# Patient Record
Sex: FEMALE | Race: WHITE | NOT HISPANIC OR LATINO | Employment: FULL TIME | URBAN - NONMETROPOLITAN AREA
[De-identification: names, ages, dates, MRNs, and addresses within clinical notes are randomized per-mention and may not be internally consistent; named-entity substitution may affect disease eponyms.]

---

## 2017-09-12 ENCOUNTER — COMMUNICATION - GICH (OUTPATIENT)
Dept: OBGYN | Facility: OTHER | Age: 27
End: 2017-09-12

## 2017-11-21 ENCOUNTER — AMBULATORY - GICH (OUTPATIENT)
Dept: SCHEDULING | Facility: OTHER | Age: 27
End: 2017-11-21

## 2017-12-28 NOTE — TELEPHONE ENCOUNTER
Patient Information     Patient Name MRN Danette Field [id number] Female 1990      Telephone Encounter by Anita Medrano RN at 2017  2:04 PM     Author:  Anita Medrano RN Service:  (none) Author Type:  NURS- Registered Nurse     Filed:  2017  2:07 PM Encounter Date:  2017 Status:  Signed     :  Anita Medrano RN (NURS- Registered Nurse)            Dr Trudy Vega from [facility] is calling - requesting a call back from Dr Kayode Kearney MD, FACOG to discuss co-managing the care of this patient. Would like a call back to discuss recent office visit.    Patient would like to receive OB care in [place] due to insurance preferences and ultimately finish OB care and delivery at [facility] with Dr Kayode Kearney MD, FACOG.    Message sent to Dr Kayode Kearney MD, FACOG. Would like MD to return call between patients.    Anita Medrano RN............. 2017 2:07 PM

## 2017-12-28 NOTE — TELEPHONE ENCOUNTER
Patient Information     Patient Name MRN Danette Field [id number] Female 1990      Telephone Encounter by Anita Medrano RN at 2017 11:01 AM     Author:  Anita Medrano RN Service:  (none) Author Type:  NURS- Registered Nurse     Filed:  2017 11:01 AM Encounter Date:  2017 Status:  Signed     :  Anita Medrano RN (NURS- Registered Nurse)            Per provider, patient spoke with Dr Vega yesterday for a phone consult regarding this patient.  Anita Medrano RN............. 2017 11:01 AM

## 2018-01-12 ENCOUNTER — PRENATAL OFFICE VISIT - GICH (OUTPATIENT)
Dept: OBGYN | Facility: OTHER | Age: 28
End: 2018-01-12

## 2018-01-12 ENCOUNTER — HISTORY (OUTPATIENT)
Dept: OBGYN | Facility: OTHER | Age: 28
End: 2018-01-12

## 2018-01-12 DIAGNOSIS — Z3A.00 WEEKS OF GESTATION OF PREGNANCY NOT SPECIFIED: ICD-10-CM

## 2018-01-12 DIAGNOSIS — O99.89 OTHER SPECIFIED DISEASES AND CONDITIONS COMPLICATING PREGNANCY, CHILDBIRTH AND THE PUERPERIUM (CODE): ICD-10-CM

## 2018-01-12 DIAGNOSIS — Z20.828 CONTACT WITH AND (SUSPECTED) EXPOSURE TO OTHER VIRAL COMMUNICABLE DISEASES: ICD-10-CM

## 2018-01-12 DIAGNOSIS — Z34.83 ENCOUNTER FOR SUPERVISION OF OTHER NORMAL PREGNANCY, THIRD TRIMESTER: ICD-10-CM

## 2018-01-14 LAB — CULTURE - HISTORICAL: NORMAL

## 2018-01-15 LAB
Lab: <0.2 U/ML
Lab: <8 AU/ML (ref 0–29.9)

## 2018-01-19 ENCOUNTER — PRENATAL OFFICE VISIT - GICH (OUTPATIENT)
Dept: OBGYN | Facility: OTHER | Age: 28
End: 2018-01-19

## 2018-01-19 ENCOUNTER — HISTORY (OUTPATIENT)
Dept: OBGYN | Facility: OTHER | Age: 28
End: 2018-01-19

## 2018-01-19 DIAGNOSIS — Z34.83 ENCOUNTER FOR SUPERVISION OF OTHER NORMAL PREGNANCY, THIRD TRIMESTER: ICD-10-CM

## 2018-01-23 ENCOUNTER — COMMUNICATION - GICH (OUTPATIENT)
Dept: OBGYN | Facility: OTHER | Age: 28
End: 2018-01-23

## 2018-01-23 DIAGNOSIS — J32.9 CHRONIC SINUSITIS: ICD-10-CM

## 2018-01-25 ENCOUNTER — HISTORY (OUTPATIENT)
Dept: OBGYN | Facility: OTHER | Age: 28
End: 2018-01-25

## 2018-01-25 ENCOUNTER — PRENATAL OFFICE VISIT - GICH (OUTPATIENT)
Dept: OBGYN | Facility: OTHER | Age: 28
End: 2018-01-25

## 2018-01-25 DIAGNOSIS — Z34.83 ENCOUNTER FOR SUPERVISION OF OTHER NORMAL PREGNANCY, THIRD TRIMESTER: ICD-10-CM

## 2018-01-30 ENCOUNTER — HISTORY (OUTPATIENT)
Dept: OBGYN | Facility: OTHER | Age: 28
End: 2018-01-30

## 2018-02-09 VITALS
TEMPERATURE: 97.9 F | WEIGHT: 161.8 LBS | DIASTOLIC BLOOD PRESSURE: 76 MMHG | SYSTOLIC BLOOD PRESSURE: 112 MMHG | BODY MASS INDEX: 26.92 KG/M2 | HEART RATE: 78 BPM

## 2018-02-09 VITALS
HEART RATE: 84 BPM | SYSTOLIC BLOOD PRESSURE: 104 MMHG | DIASTOLIC BLOOD PRESSURE: 64 MMHG | WEIGHT: 160.8 LBS | BODY MASS INDEX: 26.76 KG/M2

## 2018-02-09 VITALS
WEIGHT: 161 LBS | SYSTOLIC BLOOD PRESSURE: 118 MMHG | HEART RATE: 88 BPM | DIASTOLIC BLOOD PRESSURE: 72 MMHG | BODY MASS INDEX: 26.79 KG/M2

## 2018-02-12 NOTE — PROGRESS NOTES
Patient Information     Patient Name MRN Sex Danette Alex [id number] Female 1990      Progress Notes by Kayode Kearney MD at 2018 10:00 AM     Author:  Kayode Kearney MD Service:  (none) Author Type:  Physician     Filed:  2018 10:32 AM Encounter Date:  2018 Status:  Signed     :  Kayode Kearney MD (Physician)            [facility]  Return OB Visit    Problem List:  Estimated Date of Delivery: 18      OB History                    Para  Term     AB  Living     4   1  0     2  1     SAB   TAB  Ectopic  Multiple   Live Births       2          1                        # Outcome Date  GA  Lbr Ascencion/2nd  Weight Sex  Delivery Anes PTL Lv   4 Current                3 SAB 17               2 Para 16  39w4d    3.839 kg (8 lb 7.4 oz) M  Vag EPIDURAL  JOMAR   1 SAB 03/03/15                  Birth Comments: System Generated. Please review and update pregnancy details.                                    Immunization History     Administered  Date(s) Administered     MMR 2016     Tdap 2016       A Rh Positive  Rubella:Immune  28 week labs:done  Tdap done  flu:done  GBS:2018    S: Patient reports she has been feeling well. She denies cramping, contractions, vaginal bleeding, leaking fluid. She reports normal fetal movement. She has no other complaints. She had recent exposure to someone with CMV.    O: /64 (Cuff Site: Right Arm, Position: Sitting, Cuff Size: Adult Regular)  Pulse 84  Wt 72.9 kg (160 lb 12.8 oz)  BMI 26.76 kg/m2  See flowsheet    Gen: Well-appearing, NAD  Cervix: FT, long, posterior    Fundal Height:  36  FHR: 160  EFW AGA  Pelvis seems adequate    A/P:  Danette Miranda is a 27 y.o.  at 36w5d by LMP and early US, here for return OB visit.    RTC weekly    Kayode Kearney MD FACOG  10:29 AM 2018

## 2018-02-13 NOTE — PROGRESS NOTES
Patient Information     Patient Name MRN Sex Danette Alex [id number] Female 1990      Progress Notes by Kayode Kearney MD at 2018 10:45 AM     Author:  Kayode Kearney MD Service:  (none) Author Type:  Physician     Filed:  2018 11:19 AM Encounter Date:  2018 Status:  Signed     :  Kayode Kearney MD (Physician)            [facility]  Return OB Visit    Problem List:  Estimated Date of Delivery: 18      OB History                    Para  Term     AB  Living     4   1  0     2  1     SAB   TAB  Ectopic  Multiple   Live Births       2          1                        # Outcome Date  GA  Lbr Ascencion/2nd  Weight Sex  Delivery Anes PTL Lv   4 Current                3 SAB 17               2 Para 16  39w4d    3.839 kg (8 lb 7.4 oz) M  Vag EPIDURAL  JOMAR   1 SAB 03/03/15                  Birth Comments: System Generated. Please review and update pregnancy details.                                    Immunization History     Administered  Date(s) Administered     MMR 2016     Tdap 2016       A Rh Positive  Rubella:Immune  28 week labs:done  Tdap done  flu:done  GBS:2018    S: Patient reports she has been feeling well. She denies cramping, contractions, vaginal bleeding, leaking fluid. She reports normal fetal movement. She has no other complaints.   O: /72 (Cuff Site: Right Arm, Position: Sitting, Cuff Size: Adult Regular)  Pulse 88  Wt 73 kg (161 lb)  BMI 26.79 kg/m2  See flowsheet    Gen: Well-appearing, NAD  Cervix: 50/-1    FHR: 130  EFW AGA  Pelvis seems adequate    A/P:  Danette Miranda is a 27 y.o.  at 37w5d by LMP and early US, here for return OB visit.    RTC weekly    Kayode Kearney MD FACOG  11:18 AM 2018

## 2018-02-13 NOTE — TELEPHONE ENCOUNTER
Patient Information     Patient Name MRN aDnette Field [id number] Female 1990      Telephone Encounter by Diane Perez RN at 2018  7:43 AM     Author:  Diane Perez RN Service:  (none) Author Type:  NURS- Registered Nurse     Filed:  2018  7:43 AM Encounter Date:  2018 Status:  Signed     :  Diane Perez RN (NURS- Registered Nurse)            Patient notified of new prescription. She was very grateful.  Diane Perez RN ....................  2018   7:43 AM

## 2018-02-13 NOTE — PROGRESS NOTES
Patient Information     Patient Name MRN Sex Danette Alex [id number] Female 1990      Progress Notes by Kayode Kearney MD at 2018 10:15 AM     Author:  Kayode Kearney MD Service:  (none) Author Type:  Physician     Filed:  2018 10:33 AM Encounter Date:  2018 Status:  Signed     :  Kayode Kearney MD (Physician)            [facility]  Return OB Visit    Problem List:  Estimated Date of Delivery: 18      OB History                    Para  Term     AB  Living     4   1  0     2  1     SAB   TAB  Ectopic  Multiple   Live Births       2          1                        # Outcome Date  GA  Lbr Ascencion/2nd  Weight Sex  Delivery Anes PTL Lv   4 Current                3 SAB 17               2 Para 16  39w4d    3.839 kg (8 lb 7.4 oz) M  Vag EPIDURAL  JOMAR   1 SAB 03/03/15                  Birth Comments: System Generated. Please review and update pregnancy details.                                    Immunization History     Administered  Date(s) Administered     MMR 2016     Tdap 2016       A Rh Positive  Rubella:Immune  28 week labs:done  Tdap done  flu:done  GBS:2018    S: Patient reports she has been feeling well. She denies cramping, contractions, vaginal bleeding, leaking fluid. She reports normal fetal movement. She has no other complaints.   O: /76 (Cuff Site: Right Arm, Position: Sitting, Cuff Size: Adult Regular)  Pulse 78  Temp 97.9  F (36.6  C) (Tympanic)   Wt 73.4 kg (161 lb 12.8 oz)  BMI 26.92 kg/m2  See flowsheet    Gen: Well-appearing, NAD  Cervix: 2-3/50/-1    FHR: 140  EFW AGA  Pelvis seems adequate    A/P:  Danette Miranda is a 27 y.o.  at 38w4d by LMP and early US, here for return OB visit.    Patient requesting elective induction. She was scheduled for next week with her favorable cervix at 39w1d, assuming staff availability.    Kayode Kearney MD FACOG  10:32 AM 2018

## 2018-02-13 NOTE — TELEPHONE ENCOUNTER
Patient Information     Patient Name MRN Danette Field [id number] Female 1990      Telephone Encounter by Diane Perez RN at 2018  2:11 PM     Author:  Diane Perze RN Service:  (none) Author Type:  NURS- Registered Nurse     Filed:  2018  2:17 PM Encounter Date:  2018 Status:  Signed     :  Diane Perez RN (NURS- Registered Nurse)            Patient thinks she has a sinus infection. She has facial pressure, her teeth hurt, and she has green nasal drainage. Symptoms started 3-4 days ago. Denies fever and chills. No concerning relating to pregnancy. Her main concern is not being able to breathe well if she ends up going into labor. Patient has appointment on Thursday, but is wondering if she would be able to get a prescription sent to [facility] today. She would like this addressed by Dr Kayode Kearney MD, FACOG, not her doctor in [place], if possible.    Please advise.    Diane Perez RN ....................  2018   2:16 PM

## 2018-03-01 ENCOUNTER — DOCUMENTATION ONLY (OUTPATIENT)
Dept: FAMILY MEDICINE | Facility: OTHER | Age: 28
End: 2018-03-01

## 2018-03-01 PROBLEM — S86.819A SPRAIN AND STRAIN OF OTHER SPECIFIED SITES OF KNEE AND LEG: Status: ACTIVE | Noted: 2018-03-01

## 2018-03-01 PROBLEM — S83.8X9A SPRAIN AND STRAIN OF OTHER SPECIFIED SITES OF KNEE AND LEG: Status: ACTIVE | Noted: 2018-03-01

## 2018-03-01 PROBLEM — Z34.83 NORMAL PREGNANCY IN MULTIGRAVIDA IN THIRD TRIMESTER: Status: ACTIVE | Noted: 2018-01-12

## 2018-03-01 RX ORDER — PRENATAL VIT/IRON FUM/FOLIC AC 27MG-0.8MG
1 TABLET ORAL DAILY
COMMUNITY
Start: 2016-07-18

## 2018-03-01 RX ORDER — ACYCLOVIR 400 MG/1
400 TABLET ORAL DAILY
Status: ON HOLD | COMMUNITY
Start: 2016-02-01 | End: 2022-03-08

## 2018-03-06 ENCOUNTER — PRENATAL OFFICE VISIT (OUTPATIENT)
Dept: OBGYN | Facility: OTHER | Age: 28
End: 2018-03-06
Attending: OBSTETRICS & GYNECOLOGY
Payer: COMMERCIAL

## 2018-03-06 VITALS
WEIGHT: 152.7 LBS | HEART RATE: 82 BPM | DIASTOLIC BLOOD PRESSURE: 68 MMHG | SYSTOLIC BLOOD PRESSURE: 118 MMHG | BODY MASS INDEX: 25.41 KG/M2

## 2018-03-06 PROBLEM — Z34.83 NORMAL PREGNANCY IN MULTIGRAVIDA IN THIRD TRIMESTER: Status: RESOLVED | Noted: 2018-01-12 | Resolved: 2018-03-06

## 2018-03-06 PROCEDURE — 99207 ZZC POST-PARTUM 6 WK VISIT - GICH ONLY: CPT | Performed by: OBSTETRICS & GYNECOLOGY

## 2018-03-06 ASSESSMENT — PAIN SCALES - GENERAL: PAINLEVEL: NO PAIN (0)

## 2018-03-06 NOTE — MR AVS SNAPSHOT
"              After Visit Summary   3/6/2018    Danette Miranda    MRN: [id number]           Patient Information     Date Of Birth          1990        Visit Information        Provider Department      3/6/2018 1:00 PM Kayode Kearney MD [facility]        Today's Diagnoses     Routine postpartum follow-up    -  1       Follow-ups after your visit        Follow-up notes from your care team     Return in about 1 year (around 3/6/2019) for Physical Exam.      Who to contact     If you have questions or need follow up information about today's clinic visit or your schedule please contact [facility] directly at [phone].  Normal or non-critical lab and imaging results will be communicated to you by MyChart, letter or phone within 4 business days after the clinic has received the results. If you do not hear from us within 7 days, please contact the clinic through [employer] or phone. If you have a critical or abnormal lab result, we will notify you by phone as soon as possible.  Submit refill requests through [employer] or call your pharmacy and they will forward the refill request to us. Please allow 3 business days for your refill to be completed.          Additional Information About Your Visit        MyChart Information     [employer] lets you send messages to your doctor, view your test results, renew your prescriptions, schedule appointments and more. To sign up, go to [email or web address] . Click on \"Log in\" on the left side of the screen, which will take you to the Welcome page. Then click on \"Sign up Now\" on the right side of the page.     You will be asked to enter the access code listed below, as well as some personal information. Please follow the directions to create your username and password.     Your access code is: [email or web address]  Expires: 2018  1:42 PM     Your access code will  in 90 days. If you need help or a new code, please call your " [facility] or [phone].        Care EveryWhere ID     This is your Care EveryWhere ID. This could be used by other organizations to access your [facility] medical records  [id number]        Your Vitals Were     Pulse Breastfeeding? BMI (Body Mass Index)             82 Yes 25.41 kg/m2          Blood Pressure from Last 3 Encounters:   03/06/18 118/68   01/25/18 112/76   01/19/18 118/72    Weight from Last 3 Encounters:   03/06/18 69.3 kg (152 lb 11.2 oz)   01/25/18 73.4 kg (161 lb 12.8 oz)   01/19/18 73 kg (161 lb)              Today, you had the following     No orders found for display         Today's Medication Changes          These changes are accurate as of 3/6/18  1:42 PM.  If you have any questions, ask your nurse or doctor.               Stop taking these medicines if you haven't already. Please contact your care team if you have questions.     ferrous sulfate 134 MG Tabs   Stopped by:  Kayode Kearney MD                    Primary Care Provider Office Phone # Fax #    Brianda Edmond [phone]       [facility] [address] [facility] [address]        Equal Access to Services     [facility]: Hadii julia parsonso Soanjali, waaxda luqadaha, qaybta kaalmada aderobyada, leo miner. So [facility] [phone].    ATENCIÓN: Si habla español, tiene a berger disposición servicios gratuitos de asistencia lingüística. Llame al [phone].    We comply with applicable federal civil rights laws and [place] laws. We do not discriminate on the basis of race, color, national origin, age, disability, sex, sexual orientation, or gender identity.            Thank you!     Thank you for choosing [facility] AND [facility]  for your care. Our goal is always to provide you with excellent care. Hearing back from our patients is one way we can continue to improve our services. Please take a few minutes to complete the written survey that you may receive in the mail  after your visit with us. Thank you!             Your Updated Medication List - Protect others around you: Learn how to safely use, store and throw away your medicines at www.disposemymeds.org.          This list is accurate as of 3/6/18  1:42 PM.  Always use your most recent med list.                   Brand Name Dispense Instructions for use Diagnosis    acyclovir 400 MG tablet    ZOVIRAX     400 mg daily        prenatal multivitamin plus iron 27-0.8 MG Tabs per tablet      Take 1 tablet by mouth daily

## 2018-03-06 NOTE — PROGRESS NOTES
CC: postpartum exam at 5 weeks from delivery    HPI: Danette Miranda presents for postpartum exam. Baby was born by vaginal delivery. Complications included none.  Mood:OK    Breastfeeding:yes  Incision(s): none  Bleeding: no  Birthcontrol: considering IUD    Past Medical History:   Diagnosis Date     Incomplete spontaneous  without complication     No Comments Provided     Personal history of other medical treatment (CODE)     cruciate ligament of knee (right)     Past Surgical History:   Procedure Laterality Date     OTHER SURGICAL HISTORY      [id number],OTHER,Right,Right knee     Current Outpatient Prescriptions   Medication     acyclovir (ZOVIRAX) 400 MG tablet     Prenatal Vit-Fe Fumarate-FA (PRENATAL MULTIVITAMIN PLUS IRON) 27-0.8 MG TABS per tablet     No current facility-administered medications for this visit.       Allergies   Allergen Reactions     No Clinical Screening - See Comments Itching     Environmental allergies - eye irritation       REVIEW OF SYSTEMS  General: negative  GI: negative  : negative  Psychiatric: negative    O: /68 (BP Location: Right arm, Patient Position: Sitting)  Pulse 82  Wt 69.3 kg (152 lb 11.2 oz)  Breastfeeding? Yes  BMI 25.41 kg/m2  Body mass index is 25.41 kg/(m^2).    Exam:  Constitutional: healthy, alert, active and no distress    PHQ-9 score:    PHQ-9 SCORE 3/6/2018   Total Score 0       Results for orders placed or performed during the hospital encounter of 18   CBC with platelets differential   Result Value Ref Range    Absolute Immature Granulocytes (Metas,Myelos,Pros) - Historical 0.0 <=0.3 thou/cu mm    Absolute Monocytes - Historical 0.5 <0.9 thou/cu mm    Absolute Lymphocytes - Historical 2.3 0.9 - 2.9 thou/cu mm    % Eosinophils 0.3 <8.0 %    MCH 30.1 26.0 - 34.0 pg    MCV 91 80 - 100 fL    % Neutrophils 52.9 42.0 - 72.0 %    Hemoglobin 12.1 12.0 - 16.0 g/dL    Absolute Basophils - Historical 0.0 <0.3 thou/cu mm    BASO 0.3 <3.0 %    %  Lymphocytes 38.2 20.0 - 44.0 %    Absolute Neutrophils - Historical 3.1 1.7 - 7.0 thou/cu mm    RDW 13.8 11.5 - 15.5 %    MCHC 33.2 32.0 - 36.0 g/dL    Absolute Eosinophils - Historical 0.0 <0.5 thou/cu mm    Platelet Count 293 140 - 440 thou/cu mm    Hematocrit 36.4 33.0 - 51.0 %    Red Blood Count - Historical 4.02 4.00 - 5.20 mil/cu mm    White Blood Count - Historical 5.9 4.5 - 11.0 thou/cu mm    Immature Granulocytes (Metas,Myelos,Pros) - Historical 0.5 %    % Monocytes 7.8 <12.0 %    MPV 10.1 6.5 - 11.0 fL   Hemoglobin   Result Value Ref Range    MCV 91 80 - 100 fL    Hemoglobin 10.3 (L) 12.0 - 16.0 g/dL   ABO/Rh type and screen   Result Value Ref Range    Antibody Screen - Historical Negative Negative    ABORH - Historical A Rh Positive     Specimen Expiration Date/Time - Historical 02/02/18 06:20          I/P:  Postpartum visit.    Resumeannual preventive healthcare. Continue PNV's until done with childbearing.    (Z39.2) Routine postpartum follow-up  (primary encounter diagnosis)  Comment:   Plan: IUD insertion with menses or two weeks from now.    RTC prn    Kayode Kearney MD FACOG  1:40 PM 3/6/2018

## 2018-03-07 ASSESSMENT — PATIENT HEALTH QUESTIONNAIRE - PHQ9: SUM OF ALL RESPONSES TO PHQ QUESTIONS 1-9: 0

## 2021-08-09 ENCOUNTER — ALLIED HEALTH/NURSE VISIT (OUTPATIENT)
Dept: OBGYN | Facility: OTHER | Age: 31
End: 2021-08-09
Attending: OBSTETRICS & GYNECOLOGY
Payer: COMMERCIAL

## 2021-08-09 VITALS
SYSTOLIC BLOOD PRESSURE: 112 MMHG | OXYGEN SATURATION: 99 % | BODY MASS INDEX: 24.24 KG/M2 | HEIGHT: 65 IN | WEIGHT: 145.5 LBS | DIASTOLIC BLOOD PRESSURE: 64 MMHG | RESPIRATION RATE: 16 BRPM | HEART RATE: 68 BPM

## 2021-08-09 DIAGNOSIS — Z34.90 EARLY STAGE OF PREGNANCY: ICD-10-CM

## 2021-08-09 DIAGNOSIS — Z32.01 PREGNANCY TEST POSITIVE: Primary | ICD-10-CM

## 2021-08-09 PROBLEM — R87.610 ASCUS OF CERVIX WITH NEGATIVE HIGH RISK HPV: Status: ACTIVE | Noted: 2017-09-11

## 2021-08-09 PROBLEM — Z86.19 HISTORY OF COLD SORES: Status: ACTIVE | Noted: 2018-07-06

## 2021-08-09 LAB — HCG UR QL: POSITIVE

## 2021-08-09 PROCEDURE — 81025 URINE PREGNANCY TEST: CPT | Mod: ZL

## 2021-08-09 PROCEDURE — 99207 PR OB VISIT-NO CHARGE - GICH ONLY: CPT

## 2021-08-09 ASSESSMENT — PATIENT HEALTH QUESTIONNAIRE - PHQ9: SUM OF ALL RESPONSES TO PHQ QUESTIONS 1-9: 0

## 2021-08-09 ASSESSMENT — PAIN SCALES - GENERAL: PAINLEVEL: NO PAIN (0)

## 2021-08-09 ASSESSMENT — MIFFLIN-ST. JEOR: SCORE: 1390.86

## 2021-08-09 NOTE — PROGRESS NOTES
HPI:    This is a 30 year old female patient,  who presents today for OB Intake visit. Patient reports positive pregnancy test at home.     Obstetrical history and OB Questionnaire updated to the best of this nurse's ability based on patient report. PHQ-9 depression screening and routine Domestic Abuse screening completed. All immediate questions and concerns answered.    FOOD SECURITY SCREENING QUESTIONS  Hunger Vital Signs:  Within the past 12 months we worried whether our food would run out before we got money to buy more. Never  Within the past 12 months the food we bought just didn't last and we didn't have money to get more. Never    Last menstrual period is reported as Patient's last menstrual period was 2021 (exact date). SHAYY based on LMP is Estimated Date of Delivery: Mar 11, 2022.  Her cycles are regular.  Her last menstrual period was normal.   Since her LMP, she has experienced  nausea, emesis, fatigue and lightheadedness.       OBSTETRIC HISTORY:    OB History    Para Term  AB Living   5 2 2 0 2 2   SAB TAB Ectopic Multiple Live Births   2 0 0 0 2      # Outcome Date GA Lbr Ascencion/2nd Weight Sex Delivery Anes PTL Lv   5 Current            4 Term 18 39w2d  3.487 kg (7 lb 11 oz) F Vag-Spont   JOMAR      Name: [place]      Apgar1: 8  Apgar5: 9   3 SAB 17           2 Term 16 39w4d  3.839 kg (8 lb 7.4 oz) M Vag-Spont  N JOMAR      Name: LYUDMILA ROSS      Apgar1: 2  Apgar5: 6   1 SAB 03/03/15 7w0d             Birth Comments: System Generated. Please review and update pregnancy details.       Age of first pregnancy: 24  Previous OB Provider: Christel  Previous Delivering Clinic: [facility]  Release of Records: NA    Current delivery plan: [facility]  Preferred OB Provider: Christel  Current Primary Care Provider: Christa Fournier  Pediatrician: Dr. Chadwick    Additional History: none    Have you travelled during the pregnancy?No  Have your sexual partner(s) travelled during the  pregnancy?No      HISTORY:   Planned Pregnancy: Yes  Marital Status:   Occupation: RN  Living in Household: Significant Other and Children    Father of the baby is involved.   Family and father of baby are supportive of current pregnancy.  Past Medical History of Father of Baby:No significant medical history    Past History:  Her past medical history   Past Medical History:   Diagnosis Date     Incomplete spontaneous  without complication     No Comments Provided     Personal history of other medical treatment (CODE)     cruciate ligament of knee (right)   .      Her past surgical history:   Past Surgical History:   Procedure Laterality Date     ARTHROSCOPIC REPAIR ACL Right      DILATION AND CURETTAGE  2015       She has a history of  none    Since her LMP she denies use of alcohol, tobacco and street drugs.    Pap smear history: YES - updated in Problem List and Health Maintenance accordingly    STD/STI history: No STD history    STD/STI symptoms: no noticeable symptoms     Past medical, surgical, social and family history were reviewed and updated in EPIC.    Medications reviewed by this nurse. Current medication list:  Current Outpatient Medications   Medication Sig Dispense Refill     acyclovir (ZOVIRAX) 400 MG tablet 400 mg daily       Prenatal Vit-Fe Fumarate-FA (PRENATAL MULTIVITAMIN PLUS IRON) 27-0.8 MG TABS per tablet Take 1 tablet by mouth daily       The following medications were recommended to be discontinued due to Pregnancy Category D status: NA  Patient informed to contact her primary care provider as soon as possible to discuss a safer alternative.    Risk factors:  Moderate and moderately severe risks (consult with OB/Gyn)  Previous fetal or  demise: Yes  History of  delivery: No  History of heart disease Class I: No  Severe anemia, unresponsive to iron therapy: No  Pelvic mass or neoplasm: No  Previous : No  Hyper/hypothyroidism: No  History of  postpartum hemorrhage requiring transfusion:No  History of Placenta Accreta: No    High Risk (Pregnancy managed by OB/Gyn)  Multiple pregnancy: No  Pre-gestational diabetes: No  Chronic Hypertension: No  Renal Failure: No  Heart disease, class II or greater: No  Rh Isoimmunization: No  Chronic active hepatitis: No  Convulsive disorder, poorly controlled: No  Isoimmune thrombocytopenia: No  Pre-term premature rupture of membranes: No  Lupus or other autoimmune disorder: No  Human Immunodeficiency Virus: No    hCG Urine Qualitative   Date Value Ref Range Status   2021 Positive (A) Negative Final     Comment:     This test is for screening purposes.  Results should be interpreted along with the clinical picture.  Confirmation testing is available if warranted by ordering ACE114, HCG Quantitative Pregnancy.       ASSESSMENT/PLAN:       ICD-10-CM    1. Pregnancy test positive  Z32.01 Pregnancy, Urine (HCG)     US OB < 14 Weeks Single   2. Early stage of pregnancy  Z34.90        30 year old , 9w3d of pregnancy with SHAYY of 3/11/2022, Alternate SHAYY Entry    Urine pregnancy test was completed today and results are noted above.    Per standing orders and scope of practice of this nurse, patient will have the following orders placed and completed prior to initial OB visit with the appropriate provider:    --early ultrasound for dating and viability ordered for 6+ weeks gestation based on LMP    --Quantitative Beta HCG and progesterone monitoring if indicated    Counseling given:     - Recommended weight gain for pregnancy: 25-35 lbs.   BMI < 18.5  28-40 lbs   18.5 - 24.9 25-35   25 - 29.9 15-25   > 30  < 15       PLAN/PATIENT INSTRUCTIONS:    Normal exercise.  Normal sexual activity.  Prenatal vitamins.  Anticipated weight gain.    follow-up appointment with Dr. Kearney for pre- care and take multivitamin or pre- vitamins    Veda Lester RN.................................................. 2021 3:36  PM

## 2021-08-12 ENCOUNTER — HOSPITAL ENCOUNTER (OUTPATIENT)
Dept: ULTRASOUND IMAGING | Facility: OTHER | Age: 31
Discharge: HOME | End: 2021-08-12
Attending: OBSTETRICS & GYNECOLOGY | Admitting: OBSTETRICS & GYNECOLOGY
Payer: COMMERCIAL

## 2021-08-12 DIAGNOSIS — Z32.01 PREGNANCY TEST POSITIVE: ICD-10-CM

## 2021-08-12 PROCEDURE — 76801 OB US < 14 WKS SINGLE FETUS: CPT

## 2021-08-14 ENCOUNTER — HEALTH MAINTENANCE LETTER (OUTPATIENT)
Age: 31
End: 2021-08-14

## 2021-09-13 ENCOUNTER — PRENATAL OFFICE VISIT (OUTPATIENT)
Dept: OBGYN | Facility: OTHER | Age: 31
End: 2021-09-13
Attending: OBSTETRICS & GYNECOLOGY
Payer: COMMERCIAL

## 2021-09-13 VITALS
BODY MASS INDEX: 24.01 KG/M2 | RESPIRATION RATE: 18 BRPM | DIASTOLIC BLOOD PRESSURE: 72 MMHG | SYSTOLIC BLOOD PRESSURE: 98 MMHG | HEART RATE: 88 BPM | WEIGHT: 144.3 LBS

## 2021-09-13 DIAGNOSIS — O21.9 NAUSEA AND VOMITING DURING PREGNANCY: ICD-10-CM

## 2021-09-13 DIAGNOSIS — Z34.82 NORMAL PREGNANCY IN MULTIGRAVIDA IN SECOND TRIMESTER: ICD-10-CM

## 2021-09-13 DIAGNOSIS — Z34.90 EARLY STAGE OF PREGNANCY: Primary | ICD-10-CM

## 2021-09-13 DIAGNOSIS — G44.209 TENSION HEADACHE: ICD-10-CM

## 2021-09-13 LAB
ABO/RH(D): NORMAL
ALBUMIN UR-MCNC: NEGATIVE MG/DL
ANTIBODY SCREEN: NEGATIVE
APPEARANCE UR: CLEAR
BILIRUB UR QL STRIP: NEGATIVE
C TRACH DNA SPEC QL PROBE+SIG AMP: NEGATIVE
COLOR UR AUTO: NORMAL
ERYTHROCYTE [DISTWIDTH] IN BLOOD BY AUTOMATED COUNT: 13.5 % (ref 10–15)
GLUCOSE UR STRIP-MCNC: NEGATIVE MG/DL
HCT VFR BLD AUTO: 33 % (ref 35–47)
HGB BLD-MCNC: 11 G/DL (ref 11.7–15.7)
HGB UR QL STRIP: NEGATIVE
KETONES UR STRIP-MCNC: NEGATIVE MG/DL
LEUKOCYTE ESTERASE UR QL STRIP: NEGATIVE
MCH RBC QN AUTO: 30.1 PG (ref 26.5–33)
MCHC RBC AUTO-ENTMCNC: 33.3 G/DL (ref 31.5–36.5)
MCV RBC AUTO: 90 FL (ref 78–100)
N GONORRHOEA DNA SPEC QL NAA+PROBE: NEGATIVE
NITRATE UR QL: NEGATIVE
PH UR STRIP: 7.5 [PH] (ref 5–9)
PLATELET # BLD AUTO: 320 10E3/UL (ref 150–450)
RBC # BLD AUTO: 3.65 10E6/UL (ref 3.8–5.2)
SP GR UR STRIP: 1.02 (ref 1–1.03)
SPECIMEN EXPIRATION DATE: NORMAL
UROBILINOGEN UR STRIP-MCNC: NORMAL MG/DL
WBC # BLD AUTO: 9.6 10E3/UL (ref 4–11)

## 2021-09-13 PROCEDURE — 86901 BLOOD TYPING SEROLOGIC RH(D): CPT | Mod: ZL | Performed by: OBSTETRICS & GYNECOLOGY

## 2021-09-13 PROCEDURE — 87340 HEPATITIS B SURFACE AG IA: CPT | Mod: ZL | Performed by: OBSTETRICS & GYNECOLOGY

## 2021-09-13 PROCEDURE — 36415 COLL VENOUS BLD VENIPUNCTURE: CPT | Mod: ZL | Performed by: OBSTETRICS & GYNECOLOGY

## 2021-09-13 PROCEDURE — 99207 PR OB VISIT-NO CHARGE - GICH ONLY: CPT | Performed by: OBSTETRICS & GYNECOLOGY

## 2021-09-13 PROCEDURE — 85027 COMPLETE CBC AUTOMATED: CPT | Mod: ZL | Performed by: OBSTETRICS & GYNECOLOGY

## 2021-09-13 PROCEDURE — 86762 RUBELLA ANTIBODY: CPT | Mod: ZL | Performed by: OBSTETRICS & GYNECOLOGY

## 2021-09-13 PROCEDURE — 87389 HIV-1 AG W/HIV-1&-2 AB AG IA: CPT | Mod: ZL | Performed by: OBSTETRICS & GYNECOLOGY

## 2021-09-13 PROCEDURE — 86780 TREPONEMA PALLIDUM: CPT | Mod: ZL | Performed by: OBSTETRICS & GYNECOLOGY

## 2021-09-13 PROCEDURE — 87491 CHLMYD TRACH DNA AMP PROBE: CPT | Mod: ZL | Performed by: OBSTETRICS & GYNECOLOGY

## 2021-09-13 PROCEDURE — 81003 URINALYSIS AUTO W/O SCOPE: CPT | Mod: ZL | Performed by: OBSTETRICS & GYNECOLOGY

## 2021-09-13 RX ORDER — CYCLOBENZAPRINE HCL 5 MG
5 TABLET ORAL 3 TIMES DAILY PRN
Qty: 20 TABLET | Refills: 0 | Status: ON HOLD | OUTPATIENT
Start: 2021-09-13 | End: 2022-03-08

## 2021-09-13 RX ORDER — ONDANSETRON 4 MG/1
4 TABLET, ORALLY DISINTEGRATING ORAL EVERY 8 HOURS PRN
Qty: 20 TABLET | Refills: 0 | Status: ON HOLD | OUTPATIENT
Start: 2021-09-13 | End: 2022-03-08

## 2021-09-13 ASSESSMENT — PAIN SCALES - GENERAL: PAINLEVEL: NO PAIN (0)

## 2021-09-13 NOTE — NURSING NOTE
"Chief Complaint   Patient presents with     Prenatal Care     14w3d   Patient presents to clinic for initial ob visit. Still having some bouts of nausea, works shift work.    Initial BP 98/72 (BP Location: Right arm, Patient Position: Sitting, Cuff Size: Adult Regular)   Pulse 88   Resp 18   Wt 65.5 kg (144 lb 4.8 oz)   LMP 06/04/2021 (Exact Date)   BMI 24.01 kg/m   Estimated body mass index is 24.01 kg/m  as calculated from the following:    Height as of 8/9/21: 1.651 m (5' 5\").    Weight as of this encounter: 65.5 kg (144 lb 4.8 oz).  Medication Reconciliation: complete    ANA TURPIN, BRANDYN  "

## 2021-09-13 NOTE — PROGRESS NOTES
CC: New OB visit  HPI:  Danette Miranda is  at 14w3d based on Patient's last menstrual period was 2021 (exact date)./first trimester US.  She notes issues of some dizziness.    OB History    Para Term  AB Living   5 2 2 0 2 2   SAB TAB Ectopic Multiple Live Births   2 0 0 0 2      # Outcome Date GA Lbr Ascencion/2nd Weight Sex Delivery Anes PTL Lv   5 Current            4 Term 18 39w2d  3.487 kg (7 lb 11 oz) F Vag-Spont   JOMAR      Name: Jose      Apgar1: 8  Apgar5: 9   3 SAB 17           2 Term 16 39w4d  3.839 kg (8 lb 7.4 oz) M Vag-Spont  N JOMAR      Name: LYUDMILA MIRANDA      Apgar1: 2  Apgar5: 6   1 SAB 03/03/15 7w0d             Birth Comments: System Generated. Please review and update pregnancy details.     STI: (denies HSV, Hep C, Hep B, HIV, Syphilis, Chlamydia, Gonorrhea)  Last pap smear: No results found for: PAP  Chickenpox history: immune  Past Medical History:   Diagnosis Date     Anemia      Carrier of group B Streptococcus      Excessive postpartum bleeding      Herpes simplex virus (HSV) infection      Incomplete spontaneous  without complication     No Comments Provided     Personal history of other medical treatment (CODE)     cruciate ligament of knee (right)     Varicella      Wounds and injuries       has a past surgical history that includes Dilation and curettage (2015) and Arthroscopic Repair Acl (Right, ).    Social History     Tobacco Use     Smoking status: Never Smoker     Smokeless tobacco: Never Used   Vaping Use     Vaping Use: Never used   Substance Use Topics     Alcohol use: Not Currently     Alcohol/week: 0.0 standard drinks     Drug use: Never     Comment: Drug use: No     No family history on file.      Current Outpatient Medications   Medication     Prenatal Vit-Fe Fumarate-FA (PRENATAL MULTIVITAMIN PLUS IRON) 27-0.8 MG TABS per tablet     acyclovir (ZOVIRAX) 400 MG tablet     No current facility-administered  medications for this visit.     Allergies   Allergen Reactions     No Clinical Screening - See Comments Itching     Environmental allergies - eye irritation     Immunization History   Administered Date(s) Administered     DTAP (<7y) 1992, 08/14/1998     DTaP, Unspecified 1992     FLU 6-35 months 10/11/2011, 09/21/2012     Flu, Unspecified 10/16/2015     HPV Quadrivalent 09/14/2009, 01/17/2011, 05/24/2011     Hep B, Peds or Adolescent 02/25/1993, 04/29/1993, 07/01/1993     HepB-Adult 07/05/2011     Historical DTP/aP 02/25/1993, 04/29/1993, 02/10/1994     Influenza Vaccine IM > 6 months Valent IIV4 (Alfuria,Fluzone) 10/25/2017, 11/02/2018, 10/07/2019     MMR 02/10/1994, 08/04/2005, 07/19/2016     Meningococcal (Menactra ) 09/14/2009     OPV, trivalent, live 1992, 02/25/1993, 02/10/1994, 08/14/1998     Pedvax-hib 1992, 02/25/1993, 02/10/1994     Td (Adult), Adsorbed 08/04/2005       REVIEW OF SYSTEMS  General: negative  Resp: No shortness of breath, dyspnea on exertion, cough, or hemoptysis  CV: negative  GI: negative  : negative  Neurologic: negative  Psychiatric: negative  Hematologic: negative  Endocrine: negative    EXAM: BP 98/72 (BP Location: Right arm, Patient Position: Sitting, Cuff Size: Adult Regular)   Pulse 88   Resp 18   Wt 65.5 kg (144 lb 4.8 oz)   LMP 06/04/2021 (Exact Date)   BMI 24.01 kg/m    Gen: NAD  CV: RRR with normal S1, S2, no GRM  Resp: CTA Bilaterally  Neck: supple without thyromegaly mass or noduels  Extremities: No TTP, no deformity  Neuro: CN II-XII intact grossly, moves all extremities  Psych: normal affect and mentation.    I/P  (Z34.90) Early stage of pregnancy  (primary encounter diagnosis)  Comment:   Plan: ABO/Rh type and screen, CBC with platelets,         Hepatitis B surface antigen, HIV Antigen         Antibody Combo, Rubella Antibody IgG         Quantitative, Treponema Abs w Reflex to RPR and        Titer, UA reflex to Microscopic, GC/Chlamydia          by PCR - [place],[facility]            Discussed safety, nutrition, screening for cystic fibrosis, spina bifida, spinal muscular atrophy, quad screen, cffDNA screening as appropriate. Declines  F/U scheduled, discussed call schedule rotation.  Return visit in 1 month     Pregnancy risk factors include:      Kayode Kearney MD FACOG  1:52 PM 2021

## 2021-09-14 LAB
HBV SURFACE AG SERPL QL IA: NONREACTIVE
HIV 1+2 AB+HIV1 P24 AG SERPL QL IA: NONREACTIVE
T PALLIDUM AB SER QL: NONREACTIVE

## 2021-09-15 LAB
RUBV IGG SERPL QL IA: 2.01 INDEX
RUBV IGG SERPL QL IA: POSITIVE

## 2021-10-09 ENCOUNTER — HEALTH MAINTENANCE LETTER (OUTPATIENT)
Age: 31
End: 2021-10-09

## 2021-10-22 ENCOUNTER — PRENATAL OFFICE VISIT (OUTPATIENT)
Dept: OBGYN | Facility: OTHER | Age: 31
End: 2021-10-22
Attending: OBSTETRICS & GYNECOLOGY
Payer: COMMERCIAL

## 2021-10-22 ENCOUNTER — HOSPITAL ENCOUNTER (OUTPATIENT)
Dept: ULTRASOUND IMAGING | Facility: OTHER | Age: 31
End: 2021-10-22
Attending: OBSTETRICS & GYNECOLOGY
Payer: COMMERCIAL

## 2021-10-22 VITALS
OXYGEN SATURATION: 100 % | SYSTOLIC BLOOD PRESSURE: 108 MMHG | WEIGHT: 149 LBS | HEART RATE: 94 BPM | RESPIRATION RATE: 16 BRPM | BODY MASS INDEX: 24.79 KG/M2 | DIASTOLIC BLOOD PRESSURE: 60 MMHG

## 2021-10-22 DIAGNOSIS — Z34.90 NORMAL PREGNANCY, ANTEPARTUM: Primary | ICD-10-CM

## 2021-10-22 DIAGNOSIS — Z34.82 NORMAL PREGNANCY IN MULTIGRAVIDA IN SECOND TRIMESTER: ICD-10-CM

## 2021-10-22 PROCEDURE — 99207 PR OB VISIT-NO CHARGE - GICH ONLY: CPT | Performed by: OBSTETRICS & GYNECOLOGY

## 2021-10-22 PROCEDURE — 76805 OB US >/= 14 WKS SNGL FETUS: CPT

## 2021-10-22 ASSESSMENT — PAIN SCALES - GENERAL: PAINLEVEL: NO PAIN (0)

## 2021-10-22 NOTE — NURSING NOTE
"Patient presents to the clinic today for OB check 20w0d.    Chief Complaint   Patient presents with     Prenatal Care     20w0d       Initial /60 (BP Location: Right arm, Patient Position: Sitting, Cuff Size: Adult Regular)   Pulse 94   Resp 16   Wt 67.6 kg (149 lb)   LMP 06/04/2021 (Exact Date)   SpO2 100%   Breastfeeding No   BMI 24.79 kg/m   Estimated body mass index is 24.79 kg/m  as calculated from the following:    Height as of 8/9/21: 1.651 m (5' 5\").    Weight as of this encounter: 67.6 kg (149 lb).  Medication Reconciliation: complete  Merari Lang LPN    FOOD SECURITY SCREENING QUESTIONS  Hunger Vital Signs:  Within the past 12 months we worried whether our food would run out before we got money to buy more. Never  Within the past 12 months the food we bought just didn't last and we didn't have money to get more. Never  Merari Lang LPN 10/22/2021 4:10 PM    "

## 2021-10-22 NOTE — PROGRESS NOTES
CC: Recheck OB visit at 20w0d    HPI: Danette Miranda presents for a routine OB visit now at 20w0d  Concerns: None  Patient notices normal fetal movement, denies contractions, vaginal bleeding or leaking of fluid.    OB History    Para Term  AB Living   5 2 2 0 2 2   SAB TAB Ectopic Multiple Live Births   2 0 0 0 2      # Outcome Date GA Lbr Ascencion/2nd Weight Sex Delivery Anes PTL Lv   5 Current            4 Term 18 39w2d  3.487 kg (7 lb 11 oz) F Vag-Spont   JOMAR      Name: [place]      Apgar1: 8  Apgar5: 9   3 SAB 17           2 Term 16 39w4d  3.839 kg (8 lb 7.4 oz) M Vag-Spont  N JOMAR      Name: LYUDMILA MIRANDA      Apgar1: 2  Apgar5: 6   1 SAB 03/03/15 7w0d             Birth Comments: System Generated. Please review and update pregnancy details.     Current Outpatient Medications   Medication     ondansetron (ZOFRAN-ODT) 4 MG ODT tab     Prenatal Vit-Fe Fumarate-FA (PRENATAL MULTIVITAMIN PLUS IRON) 27-0.8 MG TABS per tablet     acyclovir (ZOVIRAX) 400 MG tablet     cyclobenzaprine (FLEXERIL) 5 MG tablet     No current facility-administered medications for this visit.     O: /60 (BP Location: Right arm, Patient Position: Sitting, Cuff Size: Adult Regular)   Pulse 94   Resp 16   Wt 67.6 kg (149 lb)   LMP 2021 (Exact Date)   SpO2 100%   Breastfeeding No   BMI 24.79 kg/m    Body mass index is 24.79 kg/m .  See OB flow sheet  EXAM:  NAD  Fetal survey appears WNL on my read    No results found for any visits on 10/22/21.    A/P: 20w0d gestation    Recheck in 4 weeks      Problem List:     Pregnancy risk factors include:      Kayode Kearney MD FACOG  4:29 PM 10/22/2021

## 2021-11-23 ENCOUNTER — PRENATAL OFFICE VISIT (OUTPATIENT)
Dept: OBGYN | Facility: OTHER | Age: 31
End: 2021-11-23
Attending: OBSTETRICS & GYNECOLOGY
Payer: COMMERCIAL

## 2021-11-23 VITALS
SYSTOLIC BLOOD PRESSURE: 110 MMHG | BODY MASS INDEX: 25.18 KG/M2 | HEART RATE: 94 BPM | WEIGHT: 151.3 LBS | DIASTOLIC BLOOD PRESSURE: 70 MMHG

## 2021-11-23 DIAGNOSIS — E74.39 GLUCOSE INTOLERANCE: ICD-10-CM

## 2021-11-23 DIAGNOSIS — Z34.90 NORMAL PREGNANCY, ANTEPARTUM: Primary | ICD-10-CM

## 2021-11-23 LAB
ERYTHROCYTE [DISTWIDTH] IN BLOOD BY AUTOMATED COUNT: 14.2 % (ref 10–15)
GLUCOSE 1H P 50 G GLC PO SERPL-MCNC: 166 MG/DL (ref 70–129)
HCT VFR BLD AUTO: 30.6 % (ref 35–47)
HGB BLD-MCNC: 10.3 G/DL (ref 11.7–15.7)
MCH RBC QN AUTO: 31.7 PG (ref 26.5–33)
MCHC RBC AUTO-ENTMCNC: 33.7 G/DL (ref 31.5–36.5)
MCV RBC AUTO: 94 FL (ref 78–100)
PLATELET # BLD AUTO: 280 10E3/UL (ref 150–450)
RBC # BLD AUTO: 3.25 10E6/UL (ref 3.8–5.2)
WBC # BLD AUTO: 10 10E3/UL (ref 4–11)

## 2021-11-23 PROCEDURE — 99207 PR OB VISIT-NO CHARGE - GICH ONLY: CPT | Performed by: OBSTETRICS & GYNECOLOGY

## 2021-11-23 PROCEDURE — 36415 COLL VENOUS BLD VENIPUNCTURE: CPT | Mod: ZL | Performed by: OBSTETRICS & GYNECOLOGY

## 2021-11-23 PROCEDURE — 82950 GLUCOSE TEST: CPT | Mod: ZL | Performed by: OBSTETRICS & GYNECOLOGY

## 2021-11-23 PROCEDURE — 86780 TREPONEMA PALLIDUM: CPT | Mod: ZL | Performed by: OBSTETRICS & GYNECOLOGY

## 2021-11-23 PROCEDURE — 85027 COMPLETE CBC AUTOMATED: CPT | Mod: ZL | Performed by: OBSTETRICS & GYNECOLOGY

## 2021-11-23 NOTE — PROGRESS NOTES
CC: Recheck OB visit at 24w4d    HPI: Danette Miranda presents for a routine OB visit now at 24w4d  Concerns: None  Patient notices normal fetal movement, denies contractions, vaginal bleeding or leaking of fluid.    OB History    Para Term  AB Living   5 2 2 0 2 2   SAB IAB Ectopic Multiple Live Births   2 0 0 0 2      # Outcome Date GA Lbr Ascencion/2nd Weight Sex Delivery Anes PTL Lv   5 Current            4 Term 18 39w2d  3.487 kg (7 lb 11 oz) F Vag-Spont   JOMAR      Name: Jose      Apgar1: 8  Apgar5: 9   3 SAB 17           2 Term 16 39w4d  3.839 kg (8 lb 7.4 oz) M Vag-Spont  N JOMAR      Name: LYUDMILA MIRANDA      Apgar1: 2  Apgar5: 6   1 SAB 03/03/15 7w0d             Birth Comments: System Generated. Please review and update pregnancy details.     Current Outpatient Medications   Medication     ondansetron (ZOFRAN-ODT) 4 MG ODT tab     Prenatal Vit-Fe Fumarate-FA (PRENATAL MULTIVITAMIN PLUS IRON) 27-0.8 MG TABS per tablet     acyclovir (ZOVIRAX) 400 MG tablet     cyclobenzaprine (FLEXERIL) 5 MG tablet     No current facility-administered medications for this visit.         O: /70   Pulse 94   Wt 68.6 kg (151 lb 4.8 oz)   LMP 2021 (Exact Date)   BMI 25.18 kg/m    Body mass index is 25.18 kg/m .  See OB flow sheet  EXAM:  NAD  FH:24 cm  FHT: 140 bpm    No results found for any visits on 21.    A/P: (Z34.90) Normal pregnancy, antepartum  (primary encounter diagnosis)  Comment:   Plan: Glucose Challenge Test (GCT) 1 Hour, Treponema         Abs w Reflex to RPR and Titer, CBC W PLT No         Diff          Recheck in 4 weeks      Problem List:     Pregnancy risk factors include:      Kayode Kearney MD FACOG  3:00 PM 2021

## 2021-11-23 NOTE — NURSING NOTE
Pt presents to clinic today for prenatal care 24w4d. Pt denies any contractions, bleeding, or leakage of fluid at this time. States baby is moving good.      Medication Reconciliation: complete  Evelyn Ellis LPN

## 2021-11-24 LAB — T PALLIDUM AB SER QL: NONREACTIVE

## 2021-12-22 ENCOUNTER — LAB (OUTPATIENT)
Dept: LAB | Facility: OTHER | Age: 31
End: 2021-12-22
Attending: OBSTETRICS & GYNECOLOGY
Payer: COMMERCIAL

## 2021-12-22 DIAGNOSIS — E74.39 GLUCOSE INTOLERANCE: ICD-10-CM

## 2021-12-22 LAB
GESTATIONAL GTT 1 HR POST DOSE: 162 MG/DL (ref 60–179)
GESTATIONAL GTT 2 HR POST DOSE: 134 MG/DL (ref 60–154)
GESTATIONAL GTT 3 HR POST DOSE: 140 MG/DL (ref 60–139)
GLUCOSE P FAST SERPL-MCNC: 84 MG/DL (ref 60–94)

## 2021-12-22 PROCEDURE — 82950 GLUCOSE TEST: CPT | Mod: ZL

## 2021-12-22 PROCEDURE — 82947 ASSAY GLUCOSE BLOOD QUANT: CPT | Mod: ZL

## 2021-12-22 PROCEDURE — 36415 COLL VENOUS BLD VENIPUNCTURE: CPT | Mod: ZL

## 2021-12-22 PROCEDURE — 82951 GLUCOSE TOLERANCE TEST (GTT): CPT | Mod: ZL

## 2021-12-24 ENCOUNTER — PRENATAL OFFICE VISIT (OUTPATIENT)
Dept: OBGYN | Facility: OTHER | Age: 31
End: 2021-12-24
Attending: OBSTETRICS & GYNECOLOGY
Payer: COMMERCIAL

## 2021-12-24 VITALS
WEIGHT: 157 LBS | HEART RATE: 80 BPM | SYSTOLIC BLOOD PRESSURE: 104 MMHG | BODY MASS INDEX: 26.13 KG/M2 | DIASTOLIC BLOOD PRESSURE: 60 MMHG

## 2021-12-24 DIAGNOSIS — Z34.90 NORMAL PREGNANCY, ANTEPARTUM: Primary | ICD-10-CM

## 2021-12-24 PROCEDURE — 99207 PR OB VISIT-NO CHARGE - GICH ONLY: CPT | Performed by: OBSTETRICS & GYNECOLOGY

## 2021-12-24 PROCEDURE — 90471 IMMUNIZATION ADMIN: CPT | Performed by: OBSTETRICS & GYNECOLOGY

## 2021-12-24 PROCEDURE — 90715 TDAP VACCINE 7 YRS/> IM: CPT | Performed by: OBSTETRICS & GYNECOLOGY

## 2021-12-24 ASSESSMENT — PAIN SCALES - GENERAL: PAINLEVEL: NO PAIN (0)

## 2021-12-24 NOTE — PROGRESS NOTES
CC: Recheck OB visit at 29w0d    HPI: Danette Miranda presents for a routine OB visit now at 29w0d  Concerns: None, passed her GTT.  Patient notices normal fetal movement, denies contractions, vaginal bleeding or leaking of fluid.    OB History    Para Term  AB Living   5 2 2 0 2 2   SAB IAB Ectopic Multiple Live Births   2 0 0 0 2      # Outcome Date GA Lbr Ascencion/2nd Weight Sex Delivery Anes PTL Lv   5 Current            4 Term 18 39w2d  3.487 kg (7 lb 11 oz) F Vag-Spont   JOMAR      Name: Jose      Apgar1: 8  Apgar5: 9   3 SAB 17           2 Term 16 39w4d  3.839 kg (8 lb 7.4 oz) M Vag-Spont  N JOMAR      Name: LYUDMILA MIRANDA      Apgar1: 2  Apgar5: 6   1 SAB 03/03/15 7w0d             Birth Comments: System Generated. Please review and update pregnancy details.     Current Outpatient Medications   Medication     acyclovir (ZOVIRAX) 400 MG tablet     cyclobenzaprine (FLEXERIL) 5 MG tablet     ondansetron (ZOFRAN-ODT) 4 MG ODT tab     Prenatal Vit-Fe Fumarate-FA (PRENATAL MULTIVITAMIN PLUS IRON) 27-0.8 MG TABS per tablet     No current facility-administered medications for this visit.       O: LMP 2021 (Exact Date)   There is no height or weight on file to calculate BMI.  See OB flow sheet  EXAM:  NAD  FH:28 cm  FHT:138 bpm    No results found for any visits on 21.    A/P: 29w0d gestation    Recheck in 3 weeks    Problem List:     Pregnancy risk factors include:      Kayode Kearney MD FACOG  9:30 AM 2021

## 2021-12-24 NOTE — NURSING NOTE
Chief Complaint   Patient presents with     Prenatal Care     29w0d       Medication Reconciliation: complete   Offers no complaints      Marycarmen Hanley LPN........................12/24/2021  9:33 AM

## 2022-01-11 ENCOUNTER — TELEPHONE (OUTPATIENT)
Dept: OBGYN | Facility: OTHER | Age: 32
End: 2022-01-11
Payer: COMMERCIAL

## 2022-01-11 DIAGNOSIS — Z34.90 NORMAL PREGNANCY, ANTEPARTUM: Primary | ICD-10-CM

## 2022-01-11 RX ORDER — BREAST PUMP
EACH MISCELLANEOUS
Qty: 1 EACH | Refills: 0 | Status: SHIPPED | OUTPATIENT
Start: 2022-01-11

## 2022-01-11 NOTE — TELEPHONE ENCOUNTER
Needs a prescription for an electric breast pump. Fax [phone].      Sim Guy on 1/11/2022 at 11:23 AM

## 2022-01-27 ENCOUNTER — PRENATAL OFFICE VISIT (OUTPATIENT)
Dept: OBGYN | Facility: OTHER | Age: 32
End: 2022-01-27
Attending: STUDENT IN AN ORGANIZED HEALTH CARE EDUCATION/TRAINING PROGRAM
Payer: COMMERCIAL

## 2022-01-27 VITALS
WEIGHT: 161 LBS | DIASTOLIC BLOOD PRESSURE: 60 MMHG | HEART RATE: 99 BPM | RESPIRATION RATE: 16 BRPM | OXYGEN SATURATION: 99 % | SYSTOLIC BLOOD PRESSURE: 108 MMHG | BODY MASS INDEX: 26.79 KG/M2

## 2022-01-27 DIAGNOSIS — Z34.93 THIRD TRIMESTER PREGNANCY: Primary | ICD-10-CM

## 2022-01-27 PROCEDURE — 99207 PR OB VISIT-NO CHARGE - GICH ONLY: CPT | Performed by: STUDENT IN AN ORGANIZED HEALTH CARE EDUCATION/TRAINING PROGRAM

## 2022-01-27 NOTE — PROGRESS NOTES
"Pt. Here for routine prenatal visit. No leaking of fluids or vaginal bleeding, a few contractions lower abdominal, nothing consistent, baby active.     Chief Complaint   Patient presents with     Prenatal Care     33w 6d       Initial /60 (BP Location: Right arm, Patient Position: Sitting, Cuff Size: Adult Regular)   Pulse 99   Resp 16   Wt 73 kg (161 lb)   LMP 06/04/2021 (Exact Date)   SpO2 99%   Breastfeeding No   BMI 26.79 kg/m   Estimated body mass index is 26.79 kg/m  as calculated from the following:    Height as of 8/9/21: 1.651 m (5' 5\").    Weight as of this encounter: 73 kg (161 lb).  Medication Reconciliation: complete    Veda Lester RN    "

## 2022-01-27 NOTE — PROGRESS NOTES
Return OB Visit    S: Ms. Miranda is feeling well today. She has no acute concerns. Denies leaking of fluid, vaginal bleeding, painful contractions. Notes fetal movements.    O:   /60 (BP Location: Right arm, Patient Position: Sitting, Cuff Size: Adult Regular)   Pulse 99   Resp 16   Wt 73 kg (161 lb)   LMP 2021 (Exact Date)   SpO2 99%   Breastfeeding No   BMI 26.79 kg/m    Gen: Well-appearing, NAD    FH 33 cm   bpm    Assessment:  Ms. Danette Miranda is a 31 year old yo  here for an OB follow up visit. She is currently 33w6d. This pregnancy is uncomplicated.    Plan:  # Routine Prenatal Care  -- Dating: LMP=10 week US SHAYY: 3/11/2022  -- PNLs:    A+, Ab screen neg   RPR nr   Hep B S Ag neg   Rubella immune   HIV neg   GC/Chlam neg    -- Genetic Screening: Declined  -- Anatomy US: Normal anatomy 39%ile EFW  -- Immunizations: s/p flu shot 10/14/21, s/p Tdap 2021  -- 3rd TM labs including CBC, RPR: RPR nr, Hgb 10.3  -- 1 hr GTT: elevated 166   3 hr GTT: passed  -- GBS: Planned for 36 weeks  -- Postpartum Planning: breastfeeding, IUD at follow up visit  -- Delivery Planning: No indication for early IOL at this time. To be discussed continually  -- Return to clinic in 2 weeks for OB follow up visit      Karlee Hemphill MD  OB/GYN  2022 12:17 PM

## 2022-02-07 ENCOUNTER — PRENATAL OFFICE VISIT (OUTPATIENT)
Dept: OBGYN | Facility: OTHER | Age: 32
End: 2022-02-07
Attending: OBSTETRICS & GYNECOLOGY
Payer: COMMERCIAL

## 2022-02-07 VITALS
DIASTOLIC BLOOD PRESSURE: 70 MMHG | SYSTOLIC BLOOD PRESSURE: 118 MMHG | HEART RATE: 104 BPM | WEIGHT: 159 LBS | BODY MASS INDEX: 26.46 KG/M2

## 2022-02-07 DIAGNOSIS — Z34.90 NORMAL PREGNANCY, ANTEPARTUM: Primary | ICD-10-CM

## 2022-02-07 PROCEDURE — 99207 PR OB VISIT-NO CHARGE - GICH ONLY: CPT | Performed by: OBSTETRICS & GYNECOLOGY

## 2022-02-07 ASSESSMENT — PAIN SCALES - GENERAL: PAINLEVEL: NO PAIN (0)

## 2022-02-07 NOTE — NURSING NOTE
"Chief Complaint   Patient presents with     Prenatal Care     35 weeks 3 day       Initial /70   Pulse 104   Wt 72.1 kg (159 lb)   LMP 06/04/2021 (Exact Date)   BMI 26.46 kg/m   Estimated body mass index is 26.46 kg/m  as calculated from the following:    Height as of 8/9/21: 1.651 m (5' 5\").    Weight as of this encounter: 72.1 kg (159 lb).  Medication Reconciliation: complete    FOOD SECURITY SCREENING QUESTIONS  Hunger Vital Signs:  Within the past 12 months we worried whether our food would run out before we got money to buy more. Never  Within the past 12 months the food we bought just didn't last and we didn't have money to get more. Never  Sarah Albright LPN 2/7/2022 3:11 PM             Sarah Albright LPN  "

## 2022-02-07 NOTE — PROGRESS NOTES
CC: Recheck OB visit at 35w3d    HPI: Danette Miranda presents for a routine OB visit now at 35w3d  Concerns: None  Patient notices normal fetal movement, denies contractions, vaginal bleeding or leaking of fluid.    OB History    Para Term  AB Living   5 2 2 0 2 2   SAB IAB Ectopic Multiple Live Births   2 0 0 0 2      # Outcome Date GA Lbr Ascencion/2nd Weight Sex Delivery Anes PTL Lv   5 Current            4 Term 18 39w2d  3.487 kg (7 lb 11 oz) F Vag-Spont   JOMAR      Name: Jose      Apgar1: 8  Apgar5: 9   3 SAB 17 7w0d          2 Term 16 39w4d  3.839 kg (8 lb 7.4 oz) M Vag-Spont  N JOMAR      Name: LYUDMILA MIRANDA      Apgar1: 2  Apgar5: 6   1 SAB 03/03/15 7w0d             Birth Comments: System Generated. Please review and update pregnancy details.     Current Outpatient Medications   Medication     acyclovir (ZOVIRAX) 400 MG tablet     cyclobenzaprine (FLEXERIL) 5 MG tablet     Misc. Devices (BREAST PUMP) MISC     ondansetron (ZOFRAN-ODT) 4 MG ODT tab     Prenatal Vit-Fe Fumarate-FA (PRENATAL MULTIVITAMIN PLUS IRON) 27-0.8 MG TABS per tablet     No current facility-administered medications for this visit.         O: /70   Pulse 104   Wt 72.1 kg (159 lb)   LMP 2021 (Exact Date)   BMI 26.46 kg/m    Body mass index is 26.46 kg/m .  See OB flow sheet  EXAM:  NAD  FH:34 cm  FHT: 150 bpm    No results found for any visits on 22.    A/P:  35w3d gestation    Recheck in 1 week    Problem List:     Pregnancy risk factors include:    GBS pos in past pregnancy, plan to treat.    Kayode Kearney MD FACOG  3:15 PM 2022

## 2022-02-21 ENCOUNTER — PRENATAL OFFICE VISIT (OUTPATIENT)
Dept: OBGYN | Facility: OTHER | Age: 32
End: 2022-02-21
Attending: OBSTETRICS & GYNECOLOGY
Payer: COMMERCIAL

## 2022-02-21 VITALS
BODY MASS INDEX: 27.09 KG/M2 | SYSTOLIC BLOOD PRESSURE: 132 MMHG | DIASTOLIC BLOOD PRESSURE: 74 MMHG | HEART RATE: 73 BPM | WEIGHT: 162.8 LBS

## 2022-02-21 DIAGNOSIS — Z34.90 NORMAL PREGNANCY, ANTEPARTUM: Primary | ICD-10-CM

## 2022-02-21 PROCEDURE — 99207 PR OB VISIT-NO CHARGE - GICH ONLY: CPT | Performed by: OBSTETRICS & GYNECOLOGY

## 2022-02-21 ASSESSMENT — PAIN SCALES - GENERAL: PAINLEVEL: NO PAIN (0)

## 2022-02-21 NOTE — NURSING NOTE
"Chief Complaint   Patient presents with     Prenatal Care     37 weeks 3 days- dizzy, nausea        Initial /74   Pulse 73   Wt 73.8 kg (162 lb 12.8 oz)   LMP 06/04/2021 (Exact Date)   BMI 27.09 kg/m   Estimated body mass index is 27.09 kg/m  as calculated from the following:    Height as of 8/9/21: 1.651 m (5' 5\").    Weight as of this encounter: 73.8 kg (162 lb 12.8 oz).  Medication Reconciliation: complete    FOOD SECURITY SCREENING QUESTIONS  Hunger Vital Signs:  Within the past 12 months we worried whether our food would run out before we got money to buy more. Never  Within the past 12 months the food we bought just didn't last and we didn't have money to get more. Never  Sarah Albright LPN 2/21/2022 12:55 PM             Sarah Albright LPN  "

## 2022-02-21 NOTE — PROGRESS NOTES
CC: Recheck OB visit at 37w3d    HPI: Danette Miranda presents for a routine OB visit now at 37w3d  Concerns: None, mildly nauseated, otherwise well.  Patient notices normal fetal movement, denies contractions, vaginal bleeding or leaking of fluid.    OB History    Para Term  AB Living   5 2 2 0 2 2   SAB IAB Ectopic Multiple Live Births   2 0 0 0 2      # Outcome Date GA Lbr Ascencion/2nd Weight Sex Delivery Anes PTL Lv   5 Current            4 Term 18 39w2d  3.487 kg (7 lb 11 oz) F Vag-Spont   JOMAR      Name: [place]      Apgar1: 8  Apgar5: 9   3 SAB 17 7w0d          2 Term 16 39w4d  3.839 kg (8 lb 7.4 oz) M Vag-Spont  N JOMAR      Name: LYUDMILA MIRANDA      Apgar1: 2  Apgar5: 6   1 SAB 03/03/15 7w0d             Birth Comments: System Generated. Please review and update pregnancy details.     Current Outpatient Medications   Medication     acyclovir (ZOVIRAX) 400 MG tablet     cyclobenzaprine (FLEXERIL) 5 MG tablet     Misc. Devices (BREAST PUMP) MISC     ondansetron (ZOFRAN-ODT) 4 MG ODT tab     Prenatal Vit-Fe Fumarate-FA (PRENATAL MULTIVITAMIN PLUS IRON) 27-0.8 MG TABS per tablet     No current facility-administered medications for this visit.         O: /74   Pulse 73   Wt 73.8 kg (162 lb 12.8 oz)   LMP 2021 (Exact Date)   BMI 27.09 kg/m    Body mass index is 27.09 kg/m .  See OB flow sheet  EXAM:  NAD    FHT:145 bpm  cx 50/-1    No results found for any visits on 22.    A/P: (Z34.90) Normal pregnancy, antepartum  (primary encounter diagnosis)  Comment:   Plan:     Recheck in 1 weeks    Problem List:     Pregnancy risk factors include:    GBS pos in past pregnancy, plan to treat.    Kayode Kearney MD FACOG  1:04 PM 2022

## 2022-03-01 ENCOUNTER — PRENATAL OFFICE VISIT (OUTPATIENT)
Dept: OBGYN | Facility: OTHER | Age: 32
End: 2022-03-01
Attending: OBSTETRICS & GYNECOLOGY
Payer: COMMERCIAL

## 2022-03-01 VITALS
WEIGHT: 163 LBS | BODY MASS INDEX: 27.12 KG/M2 | SYSTOLIC BLOOD PRESSURE: 127 MMHG | DIASTOLIC BLOOD PRESSURE: 72 MMHG | HEART RATE: 113 BPM

## 2022-03-01 DIAGNOSIS — Z37.9 NORMAL LABOR: Primary | ICD-10-CM

## 2022-03-01 PROCEDURE — 99207 PR OB VISIT-NO CHARGE - GICH ONLY: CPT | Performed by: OBSTETRICS & GYNECOLOGY

## 2022-03-01 RX ORDER — CARBOPROST TROMETHAMINE 250 UG/ML
250 INJECTION, SOLUTION INTRAMUSCULAR
Status: CANCELLED | OUTPATIENT
Start: 2022-03-01

## 2022-03-01 RX ORDER — PROCHLORPERAZINE MALEATE 10 MG
10 TABLET ORAL EVERY 6 HOURS PRN
Status: CANCELLED | OUTPATIENT
Start: 2022-03-01

## 2022-03-01 RX ORDER — FENTANYL CITRATE 50 UG/ML
50-100 INJECTION, SOLUTION INTRAMUSCULAR; INTRAVENOUS
Status: CANCELLED | OUTPATIENT
Start: 2022-03-01

## 2022-03-01 RX ORDER — LIDOCAINE 40 MG/G
CREAM TOPICAL
Status: CANCELLED | OUTPATIENT
Start: 2022-03-01

## 2022-03-01 RX ORDER — OXYTOCIN/0.9 % SODIUM CHLORIDE 30/500 ML
340 PLASTIC BAG, INJECTION (ML) INTRAVENOUS CONTINUOUS PRN
Status: CANCELLED | OUTPATIENT
Start: 2022-03-01

## 2022-03-01 RX ORDER — TRANEXAMIC ACID 10 MG/ML
1 INJECTION, SOLUTION INTRAVENOUS EVERY 30 MIN PRN
Status: CANCELLED | OUTPATIENT
Start: 2022-03-01

## 2022-03-01 RX ORDER — SODIUM CHLORIDE, SODIUM LACTATE, POTASSIUM CHLORIDE, CALCIUM CHLORIDE 600; 310; 30; 20 MG/100ML; MG/100ML; MG/100ML; MG/100ML
INJECTION, SOLUTION INTRAVENOUS CONTINUOUS PRN
Status: CANCELLED | OUTPATIENT
Start: 2022-03-01

## 2022-03-01 RX ORDER — PENICILLIN G 3000000 [IU]/50ML
3 INJECTION, SOLUTION INTRAVENOUS EVERY 4 HOURS
Status: CANCELLED | OUTPATIENT
Start: 2022-03-01

## 2022-03-01 RX ORDER — PROCHLORPERAZINE 25 MG
25 SUPPOSITORY, RECTAL RECTAL EVERY 12 HOURS PRN
Status: CANCELLED | OUTPATIENT
Start: 2022-03-01

## 2022-03-01 RX ORDER — ONDANSETRON 2 MG/ML
4 INJECTION INTRAMUSCULAR; INTRAVENOUS EVERY 6 HOURS PRN
Status: CANCELLED | OUTPATIENT
Start: 2022-03-01

## 2022-03-01 RX ORDER — NALOXONE HYDROCHLORIDE 0.4 MG/ML
0.4 INJECTION, SOLUTION INTRAMUSCULAR; INTRAVENOUS; SUBCUTANEOUS
Status: CANCELLED | OUTPATIENT
Start: 2022-03-01

## 2022-03-01 RX ORDER — OXYTOCIN 10 [USP'U]/ML
10 INJECTION, SOLUTION INTRAMUSCULAR; INTRAVENOUS
Status: CANCELLED | OUTPATIENT
Start: 2022-03-01

## 2022-03-01 RX ORDER — METOCLOPRAMIDE 10 MG/1
10 TABLET ORAL EVERY 6 HOURS PRN
Status: CANCELLED | OUTPATIENT
Start: 2022-03-01

## 2022-03-01 RX ORDER — NALOXONE HYDROCHLORIDE 0.4 MG/ML
0.2 INJECTION, SOLUTION INTRAMUSCULAR; INTRAVENOUS; SUBCUTANEOUS
Status: CANCELLED | OUTPATIENT
Start: 2022-03-01

## 2022-03-01 RX ORDER — ONDANSETRON 4 MG/1
4 TABLET, ORALLY DISINTEGRATING ORAL EVERY 6 HOURS PRN
Status: CANCELLED | OUTPATIENT
Start: 2022-03-01

## 2022-03-01 RX ORDER — MISOPROSTOL 100 UG/1
400 TABLET ORAL
Status: CANCELLED | OUTPATIENT
Start: 2022-03-01

## 2022-03-01 RX ORDER — IBUPROFEN 200 MG
600 TABLET ORAL
Status: CANCELLED | OUTPATIENT
Start: 2022-03-01 | End: 2022-03-06

## 2022-03-01 RX ORDER — METOCLOPRAMIDE HYDROCHLORIDE 5 MG/ML
10 INJECTION INTRAMUSCULAR; INTRAVENOUS EVERY 6 HOURS PRN
Status: CANCELLED | OUTPATIENT
Start: 2022-03-01

## 2022-03-01 RX ORDER — METHYLERGONOVINE MALEATE 0.2 MG/ML
200 INJECTION INTRAVENOUS
Status: CANCELLED | OUTPATIENT
Start: 2022-03-01

## 2022-03-01 RX ORDER — OXYTOCIN/0.9 % SODIUM CHLORIDE 30/500 ML
100-340 PLASTIC BAG, INJECTION (ML) INTRAVENOUS CONTINUOUS PRN
Status: CANCELLED | OUTPATIENT
Start: 2022-03-01

## 2022-03-01 RX ORDER — SODIUM CHLORIDE, SODIUM LACTATE, POTASSIUM CHLORIDE, CALCIUM CHLORIDE 600; 310; 30; 20 MG/100ML; MG/100ML; MG/100ML; MG/100ML
INJECTION, SOLUTION INTRAVENOUS CONTINUOUS
Status: CANCELLED | OUTPATIENT
Start: 2022-03-01

## 2022-03-01 RX ORDER — KETOROLAC TROMETHAMINE 30 MG/ML
30 INJECTION, SOLUTION INTRAMUSCULAR; INTRAVENOUS
Status: CANCELLED | OUTPATIENT
Start: 2022-03-01 | End: 2022-03-06

## 2022-03-01 RX ORDER — OXYTOCIN/0.9 % SODIUM CHLORIDE 30/500 ML
1-24 PLASTIC BAG, INJECTION (ML) INTRAVENOUS CONTINUOUS
Status: CANCELLED | OUTPATIENT
Start: 2022-03-01

## 2022-03-01 ASSESSMENT — PAIN SCALES - GENERAL: PAINLEVEL: NO PAIN (0)

## 2022-03-01 NOTE — PROGRESS NOTES
CC: Recheck OB visit at 38w4d    HPI: Danette Miranda presents for a routine OB visit now at 38w4d  Concerns: Patient did experience some light spotting on . Otherwise no other concerns.  Patient notices normal fetal movement, denies contractions, vaginal bleeding or leaking of fluid.    OB History    Para Term  AB Living   5 2 2 0 2 2   SAB IAB Ectopic Multiple Live Births   2 0 0 0 2      # Outcome Date GA Lbr Ascencion/2nd Weight Sex Delivery Anes PTL Lv   5 Current            4 Term 18 39w2d  3.487 kg (7 lb 11 oz) F Vag-Spont   JOMAR      Name: Jose      Apgar1: 8  Apgar5: 9   3 SAB 17 7w0d          2 Term 16 39w4d  3.839 kg (8 lb 7.4 oz) M Vag-Spont  N JOMAR      Name: LYUDMILA MIRANDA      Apgar1: 2  Apgar5: 6   1 SAB 03/03/15 7w0d             Birth Comments: System Generated. Please review and update pregnancy details.     Current Outpatient Medications   Medication     acyclovir (ZOVIRAX) 400 MG tablet     cyclobenzaprine (FLEXERIL) 5 MG tablet     Misc. Devices (BREAST PUMP) MISC     ondansetron (ZOFRAN-ODT) 4 MG ODT tab     Prenatal Vit-Fe Fumarate-FA (PRENATAL MULTIVITAMIN PLUS IRON) 27-0.8 MG TABS per tablet     No current facility-administered medications for this visit.     O: /72   Pulse 113   Wt 73.9 kg (163 lb)   LMP 2021 (Exact Date)   BMI 27.12 kg/m    Body mass index is 27.12 kg/m .  See OB flow sheet  EXAM:  NAD  cx 1-2/50/-1 cephalic, average, mid  EFW AGA  FH: 39 cm  FHT: 130 bpm    No results found for any visits on 22.    A/P: 38w4d    Discussed with patient that spotting may have been caused by mucus plug.    Elective induction scheduled for 3/7.    Problem List:   Pregnancy risk factors include:    GBS pos in past pregnancy, plan to treat.    Kayode Kearney MD FACOG  9:31 AM 3/1/2022

## 2022-03-01 NOTE — NURSING NOTE
"Chief Complaint   Patient presents with     Prenatal Care     38 weeks 4 days       Initial /72   Pulse 113   Wt 73.9 kg (163 lb)   LMP 06/04/2021 (Exact Date)   BMI 27.12 kg/m   Estimated body mass index is 27.12 kg/m  as calculated from the following:    Height as of 8/9/21: 1.651 m (5' 5\").    Weight as of this encounter: 73.9 kg (163 lb).  Medication Reconciliation: complete    FOOD SECURITY SCREENING QUESTIONS  Hunger Vital Signs:  Within the past 12 months we worried whether our food would run out before we got money to buy more. Never  Within the past 12 months the food we bought just didn't last and we didn't have money to get more. Never  Sarah Albright LPN 3/1/2022 9:20 AM           Sarah Albright LPN  "

## 2022-03-07 ENCOUNTER — ANESTHESIA EVENT (OUTPATIENT)
Dept: OBGYN | Facility: OTHER | Age: 32
End: 2022-03-07
Payer: COMMERCIAL

## 2022-03-07 ENCOUNTER — HOSPITAL ENCOUNTER (INPATIENT)
Facility: OTHER | Age: 32
LOS: 1 days | Discharge: HOME | End: 2022-03-08
Attending: OBSTETRICS & GYNECOLOGY | Admitting: OBSTETRICS & GYNECOLOGY
Payer: COMMERCIAL

## 2022-03-07 ENCOUNTER — ANESTHESIA (OUTPATIENT)
Dept: OBGYN | Facility: OTHER | Age: 32
End: 2022-03-07
Payer: COMMERCIAL

## 2022-03-07 DIAGNOSIS — Z37.9 NORMAL LABOR: ICD-10-CM

## 2022-03-07 LAB
ABO/RH(D): NORMAL
ANTIBODY SCREEN: NEGATIVE
BASOPHILS # BLD AUTO: 0 10E3/UL (ref 0–0.2)
BASOPHILS NFR BLD AUTO: 0 %
EOSINOPHIL # BLD AUTO: 0.1 10E3/UL (ref 0–0.7)
EOSINOPHIL NFR BLD AUTO: 1 %
ERYTHROCYTE [DISTWIDTH] IN BLOOD BY AUTOMATED COUNT: 13.8 % (ref 10–15)
FLUAV RNA SPEC QL NAA+PROBE: NEGATIVE
FLUBV RNA RESP QL NAA+PROBE: NEGATIVE
HCT VFR BLD AUTO: 33.1 % (ref 35–47)
HGB BLD-MCNC: 11.2 G/DL (ref 11.7–15.7)
IMM GRANULOCYTES # BLD: 0 10E3/UL
IMM GRANULOCYTES NFR BLD: 0 %
LYMPHOCYTES # BLD AUTO: 3.1 10E3/UL (ref 0.8–5.3)
LYMPHOCYTES NFR BLD AUTO: 32 %
MCH RBC QN AUTO: 30.2 PG (ref 26.5–33)
MCHC RBC AUTO-ENTMCNC: 33.8 G/DL (ref 31.5–36.5)
MCV RBC AUTO: 89 FL (ref 78–100)
MONOCYTES # BLD AUTO: 0.7 10E3/UL (ref 0–1.3)
MONOCYTES NFR BLD AUTO: 7 %
NEUTROPHILS # BLD AUTO: 5.7 10E3/UL (ref 1.6–8.3)
NEUTROPHILS NFR BLD AUTO: 60 %
NRBC # BLD AUTO: 0 10E3/UL
NRBC BLD AUTO-RTO: 0 /100
PLATELET # BLD AUTO: 245 10E3/UL (ref 150–450)
RBC # BLD AUTO: 3.71 10E6/UL (ref 3.8–5.2)
RSV RNA SPEC NAA+PROBE: NEGATIVE
SARS-COV-2 RNA RESP QL NAA+PROBE: NEGATIVE
SPECIMEN EXPIRATION DATE: NORMAL
T PALLIDUM AB SER QL: NONREACTIVE
WBC # BLD AUTO: 9.5 10E3/UL (ref 4–11)

## 2022-03-07 PROCEDURE — 59400 OBSTETRICAL CARE: CPT | Performed by: OBSTETRICS & GYNECOLOGY

## 2022-03-07 PROCEDURE — 250N000013 HC RX MED GY IP 250 OP 250 PS 637: Performed by: OBSTETRICS & GYNECOLOGY

## 2022-03-07 PROCEDURE — 258N000003 HC RX IP 258 OP 636: Performed by: OBSTETRICS & GYNECOLOGY

## 2022-03-07 PROCEDURE — 250N000009 HC RX 250: Performed by: OBSTETRICS & GYNECOLOGY

## 2022-03-07 PROCEDURE — 250N000009 HC RX 250: Performed by: NURSE ANESTHETIST, CERTIFIED REGISTERED

## 2022-03-07 PROCEDURE — 250N000011 HC RX IP 250 OP 636: Performed by: NURSE ANESTHETIST, CERTIFIED REGISTERED

## 2022-03-07 PROCEDURE — 3E033VJ INTRODUCTION OF OTHER HORMONE INTO PERIPHERAL VEIN, PERCUTANEOUS APPROACH: ICD-10-PCS | Performed by: OBSTETRICS & GYNECOLOGY

## 2022-03-07 PROCEDURE — 722N000001 HC LABOR CARE VAGINAL DELIVERY SINGLE

## 2022-03-07 PROCEDURE — 86780 TREPONEMA PALLIDUM: CPT | Performed by: OBSTETRICS & GYNECOLOGY

## 2022-03-07 PROCEDURE — 370N000003 HC ANESTHESIA WARD SERVICE

## 2022-03-07 PROCEDURE — 85025 COMPLETE CBC W/AUTO DIFF WBC: CPT | Performed by: OBSTETRICS & GYNECOLOGY

## 2022-03-07 PROCEDURE — 87637 SARSCOV2&INF A&B&RSV AMP PRB: CPT | Performed by: OBSTETRICS & GYNECOLOGY

## 2022-03-07 PROCEDURE — 86850 RBC ANTIBODY SCREEN: CPT | Performed by: OBSTETRICS & GYNECOLOGY

## 2022-03-07 PROCEDURE — 250N000011 HC RX IP 250 OP 636: Performed by: OBSTETRICS & GYNECOLOGY

## 2022-03-07 PROCEDURE — 120N000001 HC R&B MED SURG/OB

## 2022-03-07 PROCEDURE — 10907ZC DRAINAGE OF AMNIOTIC FLUID, THERAPEUTIC FROM PRODUCTS OF CONCEPTION, VIA NATURAL OR ARTIFICIAL OPENING: ICD-10-PCS | Performed by: OBSTETRICS & GYNECOLOGY

## 2022-03-07 PROCEDURE — 59400 OBSTETRICAL CARE: CPT | Performed by: NURSE ANESTHETIST, CERTIFIED REGISTERED

## 2022-03-07 PROCEDURE — 10H07YZ INSERTION OF OTHER DEVICE INTO PRODUCTS OF CONCEPTION, VIA NATURAL OR ARTIFICIAL OPENING: ICD-10-PCS | Performed by: OBSTETRICS & GYNECOLOGY

## 2022-03-07 RX ORDER — FENTANYL CITRATE 50 UG/ML
50-100 INJECTION, SOLUTION INTRAMUSCULAR; INTRAVENOUS
Status: DISCONTINUED | OUTPATIENT
Start: 2022-03-07 | End: 2022-03-07 | Stop reason: HOSPADM

## 2022-03-07 RX ORDER — METOCLOPRAMIDE 10 MG/1
10 TABLET ORAL EVERY 6 HOURS PRN
Status: DISCONTINUED | OUTPATIENT
Start: 2022-03-07 | End: 2022-03-07 | Stop reason: HOSPADM

## 2022-03-07 RX ORDER — HYDROCORTISONE 2.5 %
CREAM (GRAM) TOPICAL 3 TIMES DAILY PRN
Status: DISCONTINUED | OUTPATIENT
Start: 2022-03-07 | End: 2022-03-08 | Stop reason: HOSPADM

## 2022-03-07 RX ORDER — OXYTOCIN/0.9 % SODIUM CHLORIDE 30/500 ML
340 PLASTIC BAG, INJECTION (ML) INTRAVENOUS CONTINUOUS PRN
Status: DISCONTINUED | OUTPATIENT
Start: 2022-03-07 | End: 2022-03-07 | Stop reason: HOSPADM

## 2022-03-07 RX ORDER — LIDOCAINE 40 MG/G
CREAM TOPICAL
Status: DISCONTINUED | OUTPATIENT
Start: 2022-03-07 | End: 2022-03-07 | Stop reason: HOSPADM

## 2022-03-07 RX ORDER — OXYTOCIN 10 [USP'U]/ML
10 INJECTION, SOLUTION INTRAMUSCULAR; INTRAVENOUS
Status: DISCONTINUED | OUTPATIENT
Start: 2022-03-07 | End: 2022-03-07 | Stop reason: HOSPADM

## 2022-03-07 RX ORDER — FENTANYL/BUPIVACAINE/NS/PF 2-1250MCG
10 PLASTIC BAG, INJECTION (ML) INJECTION CONTINUOUS
Status: DISCONTINUED | OUTPATIENT
Start: 2022-03-07 | End: 2022-03-07 | Stop reason: HOSPADM

## 2022-03-07 RX ORDER — NALOXONE HYDROCHLORIDE 0.4 MG/ML
0.4 INJECTION, SOLUTION INTRAMUSCULAR; INTRAVENOUS; SUBCUTANEOUS
Status: DISCONTINUED | OUTPATIENT
Start: 2022-03-07 | End: 2022-03-07 | Stop reason: HOSPADM

## 2022-03-07 RX ORDER — ONDANSETRON 2 MG/ML
4 INJECTION INTRAMUSCULAR; INTRAVENOUS EVERY 6 HOURS PRN
Status: DISCONTINUED | OUTPATIENT
Start: 2022-03-07 | End: 2022-03-07 | Stop reason: HOSPADM

## 2022-03-07 RX ORDER — IBUPROFEN 600 MG/1
600 TABLET, FILM COATED ORAL
Status: DISCONTINUED | OUTPATIENT
Start: 2022-03-07 | End: 2022-03-07

## 2022-03-07 RX ORDER — PENICILLIN G 3000000 [IU]/50ML
3 INJECTION, SOLUTION INTRAVENOUS EVERY 4 HOURS
Status: DISCONTINUED | OUTPATIENT
Start: 2022-03-07 | End: 2022-03-07 | Stop reason: HOSPADM

## 2022-03-07 RX ORDER — MISOPROSTOL 100 UG/1
400 TABLET ORAL
Status: DISCONTINUED | OUTPATIENT
Start: 2022-03-07 | End: 2022-03-07 | Stop reason: HOSPADM

## 2022-03-07 RX ORDER — OXYTOCIN 10 [USP'U]/ML
10 INJECTION, SOLUTION INTRAMUSCULAR; INTRAVENOUS
Status: DISCONTINUED | OUTPATIENT
Start: 2022-03-07 | End: 2022-03-07

## 2022-03-07 RX ORDER — KETOROLAC TROMETHAMINE 30 MG/ML
30 INJECTION, SOLUTION INTRAMUSCULAR; INTRAVENOUS
Status: DISCONTINUED | OUTPATIENT
Start: 2022-03-07 | End: 2022-03-07

## 2022-03-07 RX ORDER — OXYTOCIN/0.9 % SODIUM CHLORIDE 30/500 ML
1-24 PLASTIC BAG, INJECTION (ML) INTRAVENOUS CONTINUOUS
Status: DISCONTINUED | OUTPATIENT
Start: 2022-03-07 | End: 2022-03-07 | Stop reason: HOSPADM

## 2022-03-07 RX ORDER — IBUPROFEN 400 MG/1
800 TABLET, FILM COATED ORAL EVERY 6 HOURS PRN
Status: DISCONTINUED | OUTPATIENT
Start: 2022-03-07 | End: 2022-03-08 | Stop reason: HOSPADM

## 2022-03-07 RX ORDER — BISACODYL 10 MG
10 SUPPOSITORY, RECTAL RECTAL DAILY PRN
Status: DISCONTINUED | OUTPATIENT
Start: 2022-03-07 | End: 2022-03-08 | Stop reason: HOSPADM

## 2022-03-07 RX ORDER — MODIFIED LANOLIN
OINTMENT (GRAM) TOPICAL
Status: DISCONTINUED | OUTPATIENT
Start: 2022-03-07 | End: 2022-03-08 | Stop reason: HOSPADM

## 2022-03-07 RX ORDER — PROCHLORPERAZINE MALEATE 10 MG
10 TABLET ORAL EVERY 6 HOURS PRN
Status: DISCONTINUED | OUTPATIENT
Start: 2022-03-07 | End: 2022-03-07 | Stop reason: HOSPADM

## 2022-03-07 RX ORDER — TRANEXAMIC ACID 10 MG/ML
1 INJECTION, SOLUTION INTRAVENOUS EVERY 30 MIN PRN
Status: DISCONTINUED | OUTPATIENT
Start: 2022-03-07 | End: 2022-03-07 | Stop reason: HOSPADM

## 2022-03-07 RX ORDER — TRANEXAMIC ACID 10 MG/ML
1 INJECTION, SOLUTION INTRAVENOUS EVERY 30 MIN PRN
Status: DISCONTINUED | OUTPATIENT
Start: 2022-03-07 | End: 2022-03-08 | Stop reason: HOSPADM

## 2022-03-07 RX ORDER — FENTANYL CITRATE-0.9 % NACL/PF 10 MCG/ML
100 PLASTIC BAG, INJECTION (ML) INTRAVENOUS EVERY 5 MIN PRN
Status: COMPLETED | OUTPATIENT
Start: 2022-03-07 | End: 2022-03-07

## 2022-03-07 RX ORDER — LIDOCAINE HYDROCHLORIDE AND EPINEPHRINE 15; 5 MG/ML; UG/ML
INJECTION, SOLUTION EPIDURAL PRN
Status: DISCONTINUED | OUTPATIENT
Start: 2022-03-07 | End: 2022-03-07

## 2022-03-07 RX ORDER — METHYLERGONOVINE MALEATE 0.2 MG/ML
200 INJECTION INTRAVENOUS
Status: DISCONTINUED | OUTPATIENT
Start: 2022-03-07 | End: 2022-03-07 | Stop reason: HOSPADM

## 2022-03-07 RX ORDER — ACETAMINOPHEN 325 MG/1
650 TABLET ORAL EVERY 4 HOURS PRN
Status: DISCONTINUED | OUTPATIENT
Start: 2022-03-07 | End: 2022-03-08 | Stop reason: HOSPADM

## 2022-03-07 RX ORDER — OXYTOCIN/0.9 % SODIUM CHLORIDE 30/500 ML
100-340 PLASTIC BAG, INJECTION (ML) INTRAVENOUS CONTINUOUS PRN
Status: DISCONTINUED | OUTPATIENT
Start: 2022-03-07 | End: 2022-03-07

## 2022-03-07 RX ORDER — OXYTOCIN/0.9 % SODIUM CHLORIDE 30/500 ML
340 PLASTIC BAG, INJECTION (ML) INTRAVENOUS CONTINUOUS PRN
Status: DISCONTINUED | OUTPATIENT
Start: 2022-03-07 | End: 2022-03-08 | Stop reason: HOSPADM

## 2022-03-07 RX ORDER — NALOXONE HYDROCHLORIDE 0.4 MG/ML
0.2 INJECTION, SOLUTION INTRAMUSCULAR; INTRAVENOUS; SUBCUTANEOUS
Status: DISCONTINUED | OUTPATIENT
Start: 2022-03-07 | End: 2022-03-07 | Stop reason: HOSPADM

## 2022-03-07 RX ORDER — OXYTOCIN 10 [USP'U]/ML
10 INJECTION, SOLUTION INTRAMUSCULAR; INTRAVENOUS
Status: DISCONTINUED | OUTPATIENT
Start: 2022-03-07 | End: 2022-03-08 | Stop reason: HOSPADM

## 2022-03-07 RX ORDER — SODIUM CHLORIDE, SODIUM LACTATE, POTASSIUM CHLORIDE, CALCIUM CHLORIDE 600; 310; 30; 20 MG/100ML; MG/100ML; MG/100ML; MG/100ML
INJECTION, SOLUTION INTRAVENOUS CONTINUOUS
Status: DISCONTINUED | OUTPATIENT
Start: 2022-03-07 | End: 2022-03-07 | Stop reason: HOSPADM

## 2022-03-07 RX ORDER — FENTANYL/BUPIVACAINE/NS/PF 2-1250MCG
PLASTIC BAG, INJECTION (ML) INJECTION CONTINUOUS PRN
Status: DISCONTINUED | OUTPATIENT
Start: 2022-03-07 | End: 2022-03-07

## 2022-03-07 RX ORDER — DOCUSATE SODIUM 100 MG/1
100 CAPSULE, LIQUID FILLED ORAL DAILY
Status: DISCONTINUED | OUTPATIENT
Start: 2022-03-07 | End: 2022-03-08 | Stop reason: HOSPADM

## 2022-03-07 RX ORDER — ONDANSETRON 4 MG/1
4 TABLET, ORALLY DISINTEGRATING ORAL EVERY 6 HOURS PRN
Status: DISCONTINUED | OUTPATIENT
Start: 2022-03-07 | End: 2022-03-07 | Stop reason: HOSPADM

## 2022-03-07 RX ORDER — PRENATAL VIT/IRON FUM/FOLIC AC 27MG-0.8MG
1 TABLET ORAL DAILY
Status: DISCONTINUED | OUTPATIENT
Start: 2022-03-07 | End: 2022-03-08 | Stop reason: HOSPADM

## 2022-03-07 RX ORDER — METHYLERGONOVINE MALEATE 0.2 MG/ML
200 INJECTION INTRAVENOUS
Status: DISCONTINUED | OUTPATIENT
Start: 2022-03-07 | End: 2022-03-08 | Stop reason: HOSPADM

## 2022-03-07 RX ORDER — PROCHLORPERAZINE 25 MG
25 SUPPOSITORY, RECTAL RECTAL EVERY 12 HOURS PRN
Status: DISCONTINUED | OUTPATIENT
Start: 2022-03-07 | End: 2022-03-07 | Stop reason: HOSPADM

## 2022-03-07 RX ORDER — MISOPROSTOL 100 UG/1
400 TABLET ORAL
Status: DISCONTINUED | OUTPATIENT
Start: 2022-03-07 | End: 2022-03-08 | Stop reason: HOSPADM

## 2022-03-07 RX ORDER — CARBOPROST TROMETHAMINE 250 UG/ML
250 INJECTION, SOLUTION INTRAMUSCULAR
Status: DISCONTINUED | OUTPATIENT
Start: 2022-03-07 | End: 2022-03-07 | Stop reason: HOSPADM

## 2022-03-07 RX ORDER — SODIUM CHLORIDE, SODIUM LACTATE, POTASSIUM CHLORIDE, CALCIUM CHLORIDE 600; 310; 30; 20 MG/100ML; MG/100ML; MG/100ML; MG/100ML
INJECTION, SOLUTION INTRAVENOUS CONTINUOUS PRN
Status: DISCONTINUED | OUTPATIENT
Start: 2022-03-07 | End: 2022-03-07 | Stop reason: HOSPADM

## 2022-03-07 RX ORDER — METOCLOPRAMIDE HYDROCHLORIDE 5 MG/ML
10 INJECTION INTRAMUSCULAR; INTRAVENOUS EVERY 6 HOURS PRN
Status: DISCONTINUED | OUTPATIENT
Start: 2022-03-07 | End: 2022-03-07 | Stop reason: HOSPADM

## 2022-03-07 RX ORDER — CARBOPROST TROMETHAMINE 250 UG/ML
250 INJECTION, SOLUTION INTRAMUSCULAR
Status: DISCONTINUED | OUTPATIENT
Start: 2022-03-07 | End: 2022-03-08 | Stop reason: HOSPADM

## 2022-03-07 RX ORDER — NALBUPHINE HYDROCHLORIDE 10 MG/ML
2.5-5 INJECTION, SOLUTION INTRAMUSCULAR; INTRAVENOUS; SUBCUTANEOUS EVERY 6 HOURS PRN
Status: DISCONTINUED | OUTPATIENT
Start: 2022-03-07 | End: 2022-03-07

## 2022-03-07 RX ADMIN — SODIUM CHLORIDE, POTASSIUM CHLORIDE, SODIUM LACTATE AND CALCIUM CHLORIDE 1000 ML: 600; 310; 30; 20 INJECTION, SOLUTION INTRAVENOUS at 10:02

## 2022-03-07 RX ADMIN — Medication 100 MCG: at 09:52

## 2022-03-07 RX ADMIN — Medication 100 MCG: at 10:27

## 2022-03-07 RX ADMIN — PENICILLIN G POTASSIUM 5 MILLION UNITS: 5000000 POWDER, FOR SOLUTION INTRAMUSCULAR; INTRAPLEURAL; INTRATHECAL; INTRAVENOUS at 06:10

## 2022-03-07 RX ADMIN — LIDOCAINE HYDROCHLORIDE AND EPINEPHRINE 3 ML: 15; 5 INJECTION, SOLUTION EPIDURAL at 09:05

## 2022-03-07 RX ADMIN — Medication 100 MCG: at 09:19

## 2022-03-07 RX ADMIN — Medication 2 MILLI-UNITS/MIN: at 06:10

## 2022-03-07 RX ADMIN — DEXTROSE MONOHYDRATE 3 MILLION UNITS: 50 INJECTION, SOLUTION INTRAVENOUS at 14:01

## 2022-03-07 RX ADMIN — IBUPROFEN 800 MG: 400 TABLET, FILM COATED ORAL at 20:42

## 2022-03-07 RX ADMIN — Medication 100 MCG: at 09:34

## 2022-03-07 RX ADMIN — DEXTROSE MONOHYDRATE 3 MILLION UNITS: 50 INJECTION, SOLUTION INTRAVENOUS at 10:02

## 2022-03-07 RX ADMIN — Medication 100 MCG: at 09:26

## 2022-03-07 RX ADMIN — SODIUM CHLORIDE, POTASSIUM CHLORIDE, SODIUM LACTATE AND CALCIUM CHLORIDE: 600; 310; 30; 20 INJECTION, SOLUTION INTRAVENOUS at 06:03

## 2022-03-07 RX ADMIN — Medication 100 MCG: at 09:58

## 2022-03-07 RX ADMIN — SODIUM CHLORIDE, POTASSIUM CHLORIDE, SODIUM LACTATE AND CALCIUM CHLORIDE: 600; 310; 30; 20 INJECTION, SOLUTION INTRAVENOUS at 14:01

## 2022-03-07 RX ADMIN — Medication 12 ML/HR: at 09:05

## 2022-03-07 RX ADMIN — Medication 100 MCG: at 09:15

## 2022-03-07 ASSESSMENT — ACTIVITIES OF DAILY LIVING (ADL)
CONCENTRATING,_REMEMBERING_OR_MAKING_DECISIONS_DIFFICULTY: NO
WEAR_GLASSES_OR_BLIND: NO
CHANGE_IN_FUNCTIONAL_STATUS_SINCE_ONSET_OF_CURRENT_ILLNESS/INJURY: NO
FALL_HISTORY_WITHIN_LAST_SIX_MONTHS: NO
DIFFICULTY_EATING/SWALLOWING: NO
DRESSING/BATHING_DIFFICULTY: NO
WALKING_OR_CLIMBING_STAIRS_DIFFICULTY: NO
DOING_ERRANDS_INDEPENDENTLY_DIFFICULTY: NO
TOILETING_ISSUES: NO

## 2022-03-07 NOTE — ANESTHESIA PREPROCEDURE EVALUATION
Anesthesia Pre-Procedure Evaluation    Patient: Danette Miranda   MRN: [id number] : 1990        Procedure : * No procedures listed *          Past Medical History:   Diagnosis Date     Anemia      ASCUS of cervix with negative high risk HPV      Carrier of group B Streptococcus      Excessive postpartum bleeding      Herpes simplex virus (HSV) infection      Incomplete spontaneous  without complication     No Comments Provided     Personal history of other medical treatment (CODE)     cruciate ligament of knee (right)     Varicella      Wounds and injuries       Past Surgical History:   Procedure Laterality Date     ARTHROSCOPIC REPAIR ACL Right      DILATION AND CURETTAGE  2015      Allergies   Allergen Reactions     No Clinical Screening - See Comments Itching     Environmental allergies - eye irritation      Social History     Tobacco Use     Smoking status: Never Smoker     Smokeless tobacco: Never Used   Substance Use Topics     Alcohol use: Not Currently     Alcohol/week: 0.0 standard drinks      Wt Readings from Last 1 Encounters:   22 73.9 kg (163 lb)        Anesthesia Evaluation   Pt has had prior anesthetic.         ROS/MED HX  ENT/Pulmonary:  - neg pulmonary ROS     Neurologic:  - neg neurologic ROS     Cardiovascular:  - neg cardiovascular ROS     METS/Exercise Tolerance: >4 METS    Hematologic:  - neg hematologic  ROS     Musculoskeletal:  - neg musculoskeletal ROS     GI/Hepatic:  - neg GI/hepatic ROS     Renal/Genitourinary:  - neg Renal ROS     Endo:  - neg endo ROS     Psychiatric/Substance Use:  - neg psychiatric ROS     Infectious Disease: Comment: HSV      Malignancy:  - neg malignancy ROS     Other:      (+) Possibly pregnant, ,         Physical Exam    Airway        Mallampati: I   TM distance: > 3 FB   Neck ROM: full   Mouth opening: > 3 cm    Respiratory Devices and Support         Dental  no notable dental history         Cardiovascular   cardiovascular  exam normal       Rhythm and rate: regular and normal     Pulmonary   pulmonary exam normal        breath sounds clear to auscultation           OUTSIDE LABS:  CBC:   Lab Results   Component Value Date    WBC 9.5 03/07/2022    WBC 10.0 11/23/2021    HGB 11.2 (L) 03/07/2022    HGB 10.3 (L) 11/23/2021    HCT 33.1 (L) 03/07/2022    HCT 30.6 (L) 11/23/2021     03/07/2022     11/23/2021     BMP: No results found for: NA, POTASSIUM, CHLORIDE, CO2, BUN, CR, GLC  COAGS: No results found for: PTT, INR, FIBR  POC:   Lab Results   Component Value Date    HCG Positive (A) 08/09/2021     HEPATIC: No results found for: ALBUMIN, PROTTOTAL, ALT, AST, GGT, ALKPHOS, BILITOTAL, BILIDIRECT, PALMA  OTHER: No results found for: PH, LACT, A1C, ADRIEL, PHOS, MAG, LIPASE, AMYLASE, TSH, T4, T3, CRP, SED    Anesthesia Plan    ASA Status:  2   NPO Status:  NPO Appropriate    Anesthesia Type: Epidural.              Consents    Anesthesia Plan(s) and associated risks, benefits, and realistic alternatives discussed. Questions answered and patient/representative(s) expressed understanding.    - Discussed:     - Discussed with:  Patient         Postoperative Care            Comments:                David Kellerman, APRN CRNA

## 2022-03-07 NOTE — H&P
[facility]    History and Physical  Obstetrics and Gynecology     Date of Admission:  3/7/2022    Assessment & Plan   Danette Miranda is a 31 year old female who presents with elective induction of labor at term.  ASSESSMENT:   IUP @ 39w3d for induction of labor.  Indication elective.  NST reactive.  Category  I    PLAN:   Admit - see IP orders    Kayode Kearney    History of Present Illness   Danette Miranda is a 31 year old female  39w3d  Estimated Date of Delivery: Mar 11, 2022 is calculated from Patient's last menstrual period was 2021 (exact date). [facility]  for [facility] of labor.  Indication elective.    PRENATAL COURSE  Prenatal course was essentially uncomplicated. History of GBS pos, requests PCN px.    Recent Labs   Lab Test 21  1410   AS Negative     Rhogam not indicated   Recent Labs   Lab Test 21  1410   HEPBANG Nonreactive   HIAGAB Nonreactive   RUQIGG Positive*       Past Medical History    I have reviewed this patient's medical history and updated it with pertinent information if needed.   Past Medical History:   Diagnosis Date     Anemia      ASCUS of cervix with negative high risk HPV      Carrier of group B Streptococcus      Excessive postpartum bleeding      Herpes simplex virus (HSV) infection      Incomplete spontaneous  without complication     No Comments Provided     Personal history of other medical treatment (CODE)     cruciate ligament of knee (right)     Varicella      Wounds and injuries        Past Surgical History   I have reviewed this patient's surgical history and updated it with pertinent information if needed.  Past Surgical History:   Procedure Laterality Date     ARTHROSCOPIC REPAIR ACL Right      DILATION AND CURETTAGE  2015       Prior to Admission Medications   Prior to Admission Medications   Prescriptions Last Dose Informant Patient Reported? Taking?   Misc. Devices (BREAST PUMP)  MISC   No No   Sig: Dispense 1 double electric breast pump per patient preference and insurance coverage. Length of need: 1 year   Prenatal Vit-Fe Fumarate-FA (PRENATAL MULTIVITAMIN PLUS IRON) 27-0.8 MG TABS per tablet 3/6/2022 at 2100  Yes Yes   Sig: Take 1 tablet by mouth daily   acyclovir (ZOVIRAX) 400 MG tablet Past Month at Unknown time  Yes Yes   Si mg daily    cyclobenzaprine (FLEXERIL) 5 MG tablet More than a month at Unknown time  No Yes   Sig: Take 1 tablet (5 mg) by mouth 3 times daily as needed for muscle spasms   ondansetron (ZOFRAN-ODT) 4 MG ODT tab More than a month at Unknown time  No Yes   Sig: Take 1 tablet (4 mg) by mouth every 8 hours as needed for nausea      Facility-Administered Medications: None     Allergies   Allergies   Allergen Reactions     No Clinical Screening - See Comments Itching     Environmental allergies - eye irritation       Social History   I have reviewed this patient's social history and updated it with pertinent information if needed. Danette BARKSDALE Ruth  reports that she has never smoked. She has never used smokeless tobacco. She reports previous alcohol use. She reports that she does not use drugs.    Family History   I have reviewed this patient's family history and updated it with pertinent information if needed.   No family history on file.    Immunization History   Immunizations are up to date    Physical Exam   Temp: 98.2  F (36.8  C) Temp src: Oral BP: 120/76 Pulse: 107   Resp: 18 SpO2: 99 % O2 Device: None (Room air)    Vital Signs with Ranges  Temp:  [98.2  F (36.8  C)] 98.2  F (36.8  C)  Pulse:  [107] 107  Resp:  [18] 18  BP: (120)/(76) 120/76  SpO2:  [99 %] 99 %    Abdomen: gravid, single vertex fetus, non-tender, EFW 7 lbs 10  Cervical Exam: per RN  Fetal Heart Tones: 140 baseline, moderate variablility, + accels, no decels and Category I  TOCO:   frequency q 3-5 minutes    Constitutional: healthy, alert, active and no distress   Respiratory: No  increased work of breathing, good air exchange, clear to auscultation bilaterally, no crackles or wheezing  Cardiovascular: Normal apical impulse, regular rate and rhythm, normal S1 and S2, no S3 or S4, and no murmur noted  Skin/Extremites: no rashes and no lesions  Neurologic: A&O x 3

## 2022-03-07 NOTE — PROGRESS NOTES
Patient stated she was feeling 'foggy' after epidural placement. Blood pressures as low as 60s/40s. A total of 700mcg of Phenylephrine given IV over 1.5hrs and 1.5L bolus. CRNA aware. Strip has remained category 1.

## 2022-03-07 NOTE — ANESTHESIA PROCEDURE NOTES
Epidural catheter Procedure Note    Pre-Procedure   Staff -        CRNA: Kellerman, David, APRN CRNA       Performed By: CRNA       Location: OB       Procedure Start/Stop Times: 3/7/2022 8:53 AM and 3/7/2022 9:05 AM       Pre-Anesthestic Checklist: patient identified, IV checked, risks and benefits discussed, informed consent, monitors and equipment checked, pre-op evaluation and post-op pain management  Timeout:       Correct Patient: Yes        Correct Procedure: Yes        Correct Position: Yes   Procedure Documentation  Procedure: epidural catheter       Diagnosis: Labor pain/ Pregnant       Patient Position: sitting       Skin prep: Betadine      Local skin infiltrated with mL of 1% lidocaine.        Insertion Site: L3-4. (midline approach).       Technique: LORT air        KEHINDE at 5.5 cm.       Needle Type: [email or web address] needle       Needle Gauge: 17.        Needle Length (Inches): 3.5        Catheter: 20 G.         Catheter threaded easily.         7.5 cm epidural space.         Threaded 13 cm at skin.         # of attempts: 1 and  # of redirects:  0    Assessment/Narrative         Paresthesias: No.       Test dose of 3 mL lidocaine 1.5% w/ 1:200,000 epinephrine at 09:05 CST.         Test dose negative, 3 minutes after injection, for signs of intravascular, subdural, or intrathecal injection.       Insertion/Infusion Method: LORT air       Aspiration negative for Heme or CSF via Epidural Catheter.

## 2022-03-08 VITALS
DIASTOLIC BLOOD PRESSURE: 55 MMHG | TEMPERATURE: 97.3 F | SYSTOLIC BLOOD PRESSURE: 108 MMHG | HEART RATE: 86 BPM | OXYGEN SATURATION: 98 % | RESPIRATION RATE: 12 BRPM

## 2022-03-08 LAB — HGB BLD-MCNC: 10.3 G/DL (ref 11.7–15.7)

## 2022-03-08 PROCEDURE — 36415 COLL VENOUS BLD VENIPUNCTURE: CPT | Performed by: OBSTETRICS & GYNECOLOGY

## 2022-03-08 PROCEDURE — 250N000013 HC RX MED GY IP 250 OP 250 PS 637: Performed by: OBSTETRICS & GYNECOLOGY

## 2022-03-08 PROCEDURE — 85018 HEMOGLOBIN: CPT | Performed by: OBSTETRICS & GYNECOLOGY

## 2022-03-08 RX ORDER — DOCUSATE SODIUM 100 MG/1
100 CAPSULE, LIQUID FILLED ORAL 2 TIMES DAILY PRN
Qty: 20 CAPSULE | Refills: 0 | Status: SHIPPED | OUTPATIENT
Start: 2022-03-08

## 2022-03-08 RX ORDER — IBUPROFEN 600 MG/1
600 TABLET, FILM COATED ORAL EVERY 6 HOURS PRN
Qty: 30 TABLET | Refills: 0 | Status: SHIPPED | OUTPATIENT
Start: 2022-03-08

## 2022-03-08 RX ADMIN — IBUPROFEN 800 MG: 400 TABLET, FILM COATED ORAL at 10:19

## 2022-03-08 RX ADMIN — DOCUSATE SODIUM 100 MG: 100 CAPSULE, LIQUID FILLED ORAL at 10:19

## 2022-03-08 RX ADMIN — PRENATAL VIT W/ FE FUMARATE-FA TAB 27-0.8 MG 1 TABLET: 27-0.8 TAB at 10:19

## 2022-03-08 RX ADMIN — ACETAMINOPHEN 650 MG: 325 TABLET, FILM COATED ORAL at 02:40

## 2022-03-08 NOTE — ANESTHESIA POSTPROCEDURE EVALUATION
Patient: Danette Miranda    Procedure: * No procedures listed *       Anesthesia Type:  Epidural    Note:  Disposition: Outpatient   Postop Pain Control: Uneventful            Sign Out: Well controlled pain   PONV: No   Neuro/Psych: Uneventful            Sign Out: Acceptable/Baseline neuro status   Airway/Respiratory: Uneventful            Sign Out: Acceptable/Baseline resp. status   CV/Hemodynamics: Uneventful            Sign Out: Acceptable CV status; No obvious hypovolemia; No obvious fluid overload   Other NRE: NONE   DID A NON-ROUTINE EVENT OCCUR? No           Last vitals:  Vitals Value Taken Time   BP     Temp     Pulse     Resp     SpO2         Electronically Signed By: LETTY TELLO, NATALIE CRNA  March 8, 2022  8:59 AM

## 2022-03-08 NOTE — PROGRESS NOTES
Patient delivered a viable baby boy via  by Dr. Kearney at 1733 over an intact perineum. Baby placed to mom's chest for skin to skin. Diane Krishnamurthy RN on 3/7/2022 at 7:49 PM

## 2022-03-08 NOTE — PLAN OF CARE
Goal Outcome Evaluation:      VSS. Patient's pain has been well controlled with PRN ibuprofen. Patient up independent in her room. Took a bath. Bleeding is light with no clots.

## 2022-03-08 NOTE — DISCHARGE SUMMARY
[facility]    Discharge Summary  Obstetrics    Date of Admission:  3/7/2022  Date of Discharge:  3/8/2022  Discharging Provider: Kayode Kearney    Discharge Diagnoses   Normal vaginal delivery    History of Present Illness   Danette Miranda is a 31 year old female who presented with induction of labor and normal vaginal delivery.    Hospital Course   The patient's hospital course was unremarkable.  She recovered as anticipated and experienced no post-delivery complications.  On discharge, her pain was well controlled. Vaginal bleeding is similar to peak menstrual flow.  Voiding without difficulty.  Ambulating well and tolerating a normal diet.  No fevers.  Breastfeeding well.  Infant is stable.  She was discharged on post-partum day #1.    Post-partum hemoglobin:   Hemoglobin   Date Value Ref Range Status   2022 11.2 (L) 11.7 - 15.7 g/dL Final   2018 10.3 (L) 12.0 - 16.0 g/dL        Kayode Kearney MD    Discharge Disposition   Discharged to home   Condition at discharge: Stable    Primary Care Physician   Brianda Edmond    Consultations This Hospital Stay   HOME CARE POST PARTUM/ IP CONSULT  LACTATION IP CONSULT    Discharge Orders      Activity    Activity as tolerated     Reason for your hospital stay    Maternity care     Follow Up    Follow up with provider in 2 weeks and 6 weeks for post-delivery checks     Breast pump - Manual/Electric    Breast Pump Documentation:  Manual/Electric Pump: To support adequate breast milk production and nutrition for infant.     I, the undersigned, certify that the above prescribed supplies are medically necessary for this patient and is both reasonable and necessary in reference to accepted standards of medical and necessary in reference to accepted standards of medical practice in the treatment of this patient's condition and is not prescribed as a convenience.     Diet    Resume previous diet     Discharge Medications   Current  Discharge Medication List      START taking these medications    Details   docusate sodium (COLACE) 100 MG capsule Take 1 capsule (100 mg) by mouth 2 times daily as needed for constipation  Qty: 20 capsule, Refills: 0    Associated Diagnoses:  (normal spontaneous vaginal delivery)      ibuprofen (ADVIL/MOTRIN) 600 MG tablet Take 1 tablet (600 mg) by mouth every 6 hours as needed for other (cramping)  Qty: 30 tablet, Refills: 0    Associated Diagnoses:  (normal spontaneous vaginal delivery)         CONTINUE these medications which have NOT CHANGED    Details   Prenatal Vit-Fe Fumarate-FA (PRENATAL MULTIVITAMIN PLUS IRON) 27-0.8 MG TABS per tablet Take 1 tablet by mouth daily      Misc. Devices (BREAST PUMP) MISC Dispense 1 double electric breast pump per patient preference and insurance coverage. Length of need: 1 year  Qty: 1 each, Refills: 0    Associated Diagnoses: Normal pregnancy, antepartum         STOP taking these medications       acyclovir (ZOVIRAX) 400 MG tablet Comments:   Reason for Stopping:         cyclobenzaprine (FLEXERIL) 5 MG tablet Comments:   Reason for Stopping:         ondansetron (ZOFRAN-ODT) 4 MG ODT tab Comments:   Reason for Stopping:             Allergies   Allergies   Allergen Reactions     No Clinical Screening - See Comments Itching     Environmental allergies - eye irritation

## 2022-03-08 NOTE — PROGRESS NOTES
[facility]    Post-Partum Progress Note    Assessment & Plan   Assessment:  Post-partum day #1  Normal spontaneous vaginal delivery    Doing well.    Plan:  Discharge later today    Kayode Kearney     Interval History   Doing well.  Pain is well-controlled.  No fevers.  No history of foul-smelling vaginal discharge.  Good appetite.  Denies chest pain, shortness of breath, nausea or vomiting.  Vaginal bleeding is similar to a heavy menstrual flow.  Ambulatory.  Breastfeeding well.    Medications     oxytocin in 0.9% NaCl         docusate sodium  100 mg Oral Daily     Measles, Mumps & Rubella Vac  0.5 mL Subcutaneous Once     prenatal multivitamin w/iron  1 tablet Oral Daily     Tdap (tetanus-diptheria-acell pertussis)  0.5 mL Intramuscular Once       Physical Exam   Temp: 97  F (36.1  C) Temp src: Temporal BP: 104/55 Pulse: 86   Resp: 14 SpO2: 98 % O2 Device: None (Room air)    There were no vitals filed for this visit.  Vital Signs with Ranges  Temp:  [97  F (36.1  C)-98.6  F (37  C)] 97  F (36.1  C)  Pulse:  [] 86  Resp:  [14-18] 14  BP: ()/(40-84) 104/55  SpO2:  [98 %-99 %] 98 %  I/O last 3 completed shifts:  In: -   Out: 135 [Blood:135]    Uterine fundus is firm, non-tender and at the level of the umbilicus  Extremities Non-tender    Data   Recent Labs   Lab Test 03/07/22  0556   AS Negative     Recent Labs   Lab Test 03/07/22  0556 11/23/21  1550   HGB 11.2* 10.3*     Recent Labs   Lab Test 09/13/21  1410   RUQIGG Positive*

## 2022-03-09 NOTE — PROGRESS NOTES
Patient discharged to home at 1915 with spouse and baby. All discharge instructions have been gone over with patient and her spouse. Patient states that she feels comfortable going home. Diane Krishnamurthy RN on 3/8/2022 at 7:42 PM

## 2022-04-19 ENCOUNTER — PRENATAL OFFICE VISIT (OUTPATIENT)
Dept: OBGYN | Facility: OTHER | Age: 32
End: 2022-04-19
Attending: OBSTETRICS & GYNECOLOGY
Payer: COMMERCIAL

## 2022-04-19 VITALS
DIASTOLIC BLOOD PRESSURE: 76 MMHG | BODY MASS INDEX: 24.93 KG/M2 | HEART RATE: 72 BPM | WEIGHT: 149.8 LBS | SYSTOLIC BLOOD PRESSURE: 115 MMHG

## 2022-04-19 DIAGNOSIS — Z30.430 ENCOUNTER FOR IUD INSERTION: Primary | ICD-10-CM

## 2022-04-19 PROCEDURE — 99207 PR POST-PARTUM 6 WK VISIT - GICH ONLY: CPT | Mod: 25 | Performed by: OBSTETRICS & GYNECOLOGY

## 2022-04-19 PROCEDURE — 250N000011 HC RX IP 250 OP 636: Performed by: OBSTETRICS & GYNECOLOGY

## 2022-04-19 PROCEDURE — 58300 INSERT INTRAUTERINE DEVICE: CPT | Performed by: OBSTETRICS & GYNECOLOGY

## 2022-04-19 RX ADMIN — LEVONORGESTREL 20 MCG: 52 INTRAUTERINE DEVICE INTRAUTERINE at 11:50

## 2022-04-19 ASSESSMENT — EDINBURGH POSTNATAL DEPRESSION SCALE (EPDS)
THE THOUGHT OF HARMING MYSELF HAS OCCURRED TO ME: NEVER
I HAVE LOOKED FORWARD WITH ENJOYMENT TO THINGS: AS MUCH AS I EVER DID
I HAVE BLAMED MYSELF UNNECESSARILY WHEN THINGS WENT WRONG: NO, NEVER
I HAVE BEEN SO UNHAPPY THAT I HAVE BEEN CRYING: NO, NEVER
I HAVE BEEN SO UNHAPPY THAT I HAVE HAD DIFFICULTY SLEEPING: NOT AT ALL
I HAVE BEEN ANXIOUS OR WORRIED FOR NO GOOD REASON: NO, NOT AT ALL
THINGS HAVE BEEN GETTING ON TOP OF ME: NO, I HAVE BEEN COPING AS WELL AS EVER
I HAVE BEEN ABLE TO LAUGH AND SEE THE FUNNY SIDE OF THINGS: AS MUCH AS I ALWAYS COULD
I HAVE BEEN ANXIOUS OR WORRIED FOR NO GOOD REASON: NO, NOT AT ALL
I HAVE FELT SCARED OR PANICKY FOR NO GOOD REASON: NO, NOT AT ALL
THE THOUGHT OF HARMING MYSELF HAS OCCURRED TO ME: NEVER
THINGS HAVE BEEN GETTING ON TOP OF ME: NO, I HAVE BEEN COPING AS WELL AS EVER
I HAVE BEEN SO UNHAPPY THAT I HAVE HAD DIFFICULTY SLEEPING: NOT AT ALL
I HAVE FELT SCARED OR PANICKY FOR NO GOOD REASON: NO, NOT AT ALL
I HAVE BEEN ABLE TO LAUGH AND SEE THE FUNNY SIDE OF THINGS: AS MUCH AS I ALWAYS COULD
I HAVE FELT SAD OR MISERABLE: NO, NOT AT ALL
I HAVE BEEN SO UNHAPPY THAT I HAVE BEEN CRYING: NO, NEVER
I HAVE BLAMED MYSELF UNNECESSARILY WHEN THINGS WENT WRONG: NO, NEVER
TOTAL SCORE: 0
I HAVE FELT SAD OR MISERABLE: NO, NOT AT ALL
I HAVE LOOKED FORWARD WITH ENJOYMENT TO THINGS: AS MUCH AS I EVER DID

## 2022-04-19 ASSESSMENT — PAIN SCALES - GENERAL: PAINLEVEL: NO PAIN (0)

## 2022-04-19 NOTE — PROGRESS NOTES
CC: postpartum exam at 6 weeks from delivery    HPI: Danette Miranda presents for postpartum exam. Baby was born by vaginal delivery. Complications included none  Mood:OK    Breastfeeding:yes  Incision(s):  no  Bleeding: no  Birthcontrol: Mirena IUD    Past Medical History:   Diagnosis Date     Anemia      ASCUS of cervix with negative high risk HPV      Carrier of group B Streptococcus      Excessive postpartum bleeding      Herpes simplex virus (HSV) infection      Incomplete spontaneous  without complication     No Comments Provided     Personal history of other medical treatment (CODE)     cruciate ligament of knee (right)     Varicella      Wounds and injuries      Past Surgical History:   Procedure Laterality Date     ARTHROSCOPIC REPAIR ACL Right      DILATION AND CURETTAGE  2015     Current Outpatient Medications   Medication     docusate sodium (COLACE) 100 MG capsule     ibuprofen (ADVIL/MOTRIN) 600 MG tablet     Misc. Devices (BREAST PUMP) MISC     Prenatal Vit-Fe Fumarate-FA (PRENATAL MULTIVITAMIN PLUS IRON) 27-0.8 MG TABS per tablet     No current facility-administered medications for this visit.      Allergies   Allergen Reactions     No Clinical Screening - See Comments Itching     Environmental allergies - eye irritation       REVIEW OF SYSTEMS:  Neg for bowel, bladder, and breast    O: /76 (Cuff Size: Adult Regular)   Pulse 72   Wt 67.9 kg (149 lb 12.8 oz)   LMP 2021 (Exact Date)   Breastfeeding Yes   BMI 24.93 kg/m    Body mass index is 24.93 kg/m .    Exam:  Constitutional: healthy, alert, active and no distress  Pelvic: Normal BUSE, vulva appears normal, vagina and cervix are normal.     IUD placement:    After consent was obtained and timeout performed, the patient was positioned in dorsal lithotomy. A speculum was inserted. The cervix was prepped with betadine. The cervix was grasped on the anterior lip and the uterus sounded to 7 cm. The Mirena IUD was  inserted to 7 cm without difficulty and easily deployed. The strings were trimmed to 2 cm. The cervix was released from the tenaculum. The cervix was made hemostatic with silver nitrate. The patient tolerated the procedure. No complications.       Chaperone was present.      [place] Depression Scale  Thoughts of Harming Self:    Total Score:          No results found for any visits on 04/19/22.    I/P: She was instructed to return in one month for a string check. Removal or replacement is recommended in 5-7 years from 4/19/2022. She is to call with signs of heavy bleeding, foul discharge, pain, or expulsion of the IUD, or inability to feel her strings.      Kayode Kearney MD FACOG  12:38 PM 4/19/2022

## 2022-04-19 NOTE — NURSING NOTE
"  Chief Complaint   Patient presents with     Postpartum Care     6 week       Initial LMP 06/04/2021 (Exact Date)  Estimated body mass index is 27.12 kg/m  as calculated from the following:    Height as of 8/9/21: 1.651 m (5' 5\").    Weight as of 3/1/22: 73.9 kg (163 lb).  Medication Reconciliation: complete      Prior to the start of the procedure and with procedural staff participation, I verbally confirmed the patient s identity using two indicators, relevant allergies, that the procedure was appropriate and matched the consent or emergent situation, and that the correct equipment/implants were available. Immediately prior to starting the procedure I conducted the Time Out with the procedural staff and re-confirmed the patient s name, procedure, and site/side. (The Joint Commission universal protocol was followed.)  Yes    Sedation (Moderate or Deep): None    Diane Perez RN  "

## 2022-06-28 DIAGNOSIS — O21.9 NAUSEA AND VOMITING DURING PREGNANCY: ICD-10-CM

## 2022-06-28 RX ORDER — ONDANSETRON 4 MG/1
TABLET, ORALLY DISINTEGRATING ORAL
Qty: 20 TABLET | Refills: 0 | OUTPATIENT
Start: 2022-06-28

## 2022-06-28 NOTE — TELEPHONE ENCOUNTER
Medication has been discontinued by provider at discharge.       Trudy Rider RN on 6/28/2022 at 11:17 AM

## 2022-06-28 NOTE — TELEPHONE ENCOUNTER
Medication has been discontinued by provider at discharge. Refill not appropriate.     Trudy Rider RN on 6/28/2022 at 3:08 PM

## 2022-09-17 ENCOUNTER — HEALTH MAINTENANCE LETTER (OUTPATIENT)
Age: 32
End: 2022-09-17

## 2023-10-08 ENCOUNTER — HEALTH MAINTENANCE LETTER (OUTPATIENT)
Age: 33
End: 2023-10-08

## 2024-11-30 ENCOUNTER — HEALTH MAINTENANCE LETTER (OUTPATIENT)
Age: 34
End: 2024-11-30